# Patient Record
Sex: FEMALE | Race: WHITE | NOT HISPANIC OR LATINO | Employment: FULL TIME | URBAN - NONMETROPOLITAN AREA
[De-identification: names, ages, dates, MRNs, and addresses within clinical notes are randomized per-mention and may not be internally consistent; named-entity substitution may affect disease eponyms.]

---

## 2017-01-16 ENCOUNTER — APPOINTMENT (OUTPATIENT)
Dept: PHYSICAL THERAPY | Facility: CLINIC | Age: 37
End: 2017-01-16
Payer: COMMERCIAL

## 2017-01-16 PROCEDURE — 97161 PT EVAL LOW COMPLEX 20 MIN: CPT

## 2017-01-16 PROCEDURE — 97140 MANUAL THERAPY 1/> REGIONS: CPT

## 2017-01-16 PROCEDURE — 97110 THERAPEUTIC EXERCISES: CPT

## 2017-01-18 ENCOUNTER — APPOINTMENT (OUTPATIENT)
Dept: PHYSICAL THERAPY | Facility: CLINIC | Age: 37
End: 2017-01-18
Payer: COMMERCIAL

## 2017-01-18 PROCEDURE — 97140 MANUAL THERAPY 1/> REGIONS: CPT

## 2017-01-18 PROCEDURE — 97110 THERAPEUTIC EXERCISES: CPT

## 2017-01-20 ENCOUNTER — APPOINTMENT (OUTPATIENT)
Dept: PHYSICAL THERAPY | Facility: CLINIC | Age: 37
End: 2017-01-20
Payer: COMMERCIAL

## 2019-11-01 ENCOUNTER — EVALUATION (OUTPATIENT)
Dept: PHYSICAL THERAPY | Facility: CLINIC | Age: 39
End: 2019-11-01
Payer: COMMERCIAL

## 2019-11-01 ENCOUNTER — TRANSCRIBE ORDERS (OUTPATIENT)
Dept: PHYSICAL THERAPY | Facility: CLINIC | Age: 39
End: 2019-11-01

## 2019-11-01 DIAGNOSIS — M43.17 SPONDYLOLISTHESIS OF LUMBOSACRAL REGION: Primary | ICD-10-CM

## 2019-11-01 PROCEDURE — 97535 SELF CARE MNGMENT TRAINING: CPT | Performed by: PHYSICAL THERAPIST

## 2019-11-01 PROCEDURE — 97162 PT EVAL MOD COMPLEX 30 MIN: CPT | Performed by: PHYSICAL THERAPIST
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
## 2019-11-04 ENCOUNTER — OFFICE VISIT (OUTPATIENT)
Dept: PHYSICAL THERAPY | Facility: CLINIC | Age: 39
End: 2019-11-04
Payer: COMMERCIAL

## 2019-11-04 DIAGNOSIS — M43.17 SPONDYLOLISTHESIS OF LUMBOSACRAL REGION: Primary | ICD-10-CM

## 2019-11-04 PROCEDURE — 97110 THERAPEUTIC EXERCISES: CPT | Performed by: PHYSICAL THERAPIST

## 2019-11-04 PROCEDURE — 97140 MANUAL THERAPY 1/> REGIONS: CPT | Performed by: PHYSICAL THERAPIST

## 2019-11-04 NOTE — PROGRESS NOTES
Daily Note     Today's date: 2019  Patient name: Deysi Evans  : 1980  MRN: [id number]  Referring provider: Dahiana Rushing PA-C  Dx:   Encounter Diagnosis     ICD-10-CM    1  Spondylolisthesis of lumbosacral region M43 17                   Subjective: "I'm not too bad today  The exercises were ok at home"  Objective: See treatment diary below  Manual           LE Stretch    15 min                                                                       Exercise Diary           Abdominal Brace    2x10         Brace with march    2x10         Brace with SLR             Brace with heel slides    2x10 B/L         Bridges             Bridge with hip abduction    2x10         Bridge with hip adduction    2x10         Lower Abd curl    2x10         Alternating Hip flex/Abd isometrics             B/L Hip/Abd isometrics                                                                                                                                                               Modalities                                                             Assessment: Patient with good tolerance to first treatment today  Minimal VC's required for correct performance of TE  Mild discomfort noted with lower abd curls otherwise no increase in symptoms reported  Patient may benefit from home TENS unit for pain relief  Plan: Continue per plan of care        Precautions: None

## 2019-11-07 ENCOUNTER — OFFICE VISIT (OUTPATIENT)
Dept: PHYSICAL THERAPY | Facility: CLINIC | Age: 39
End: 2019-11-07
Payer: COMMERCIAL

## 2019-11-07 DIAGNOSIS — M43.17 SPONDYLOLISTHESIS OF LUMBOSACRAL REGION: Primary | ICD-10-CM

## 2019-11-07 PROCEDURE — 97140 MANUAL THERAPY 1/> REGIONS: CPT | Performed by: PHYSICAL THERAPIST

## 2019-11-07 PROCEDURE — 97110 THERAPEUTIC EXERCISES: CPT | Performed by: PHYSICAL THERAPIST

## 2019-11-07 NOTE — PROGRESS NOTES
Daily Note     Today's date: 2019  Patient name: Angelica Bocanegra  : 1980  MRN: [id number]  Referring provider: Praneeth Antoine PA-C  Dx:   Encounter Diagnosis     ICD-10-CM    1  Spondylolisthesis of lumbosacral region M43 17                   Subjective: "I was a little sore after last time"  Objective: See treatment diary below  [facility]        LE Stretch    15 min  15 min                                                                     Exercise Diary         Abdominal Brace    2x10  2x10       Brace with march    2x10  2x10       Brace with SLR      2x10 B       Brace with heel slides    2x10 B/L  2x10 B/L       Bridges             Bridge with hip abduction    2x10  2x10 Blue       Bridge with hip adduction    2x10  2x10       Lower Abd curl    2x10  2x10       Alternating Hip flex/Abd isometrics             B/L Hip/Abd isometrics                                                                                                                                                               Modalities                                                             Assessment: Patient with good tolerance to treatment today  PT notes improving core stability with therex  Patient given and instructed on TENS unit today  Plan: Continue per plan of care        Precautions: None

## 2019-11-14 ENCOUNTER — OFFICE VISIT (OUTPATIENT)
Dept: PHYSICAL THERAPY | Facility: CLINIC | Age: 39
End: 2019-11-14
Payer: COMMERCIAL

## 2019-11-14 DIAGNOSIS — M43.17 SPONDYLOLISTHESIS OF LUMBOSACRAL REGION: Primary | ICD-10-CM

## 2019-11-14 PROCEDURE — 97110 THERAPEUTIC EXERCISES: CPT | Performed by: PHYSICAL THERAPIST

## 2019-11-14 PROCEDURE — 97140 MANUAL THERAPY 1/> REGIONS: CPT | Performed by: PHYSICAL THERAPIST

## 2019-11-14 NOTE — PROGRESS NOTES
Daily Note     Today's date: 2019  Patient name: Princess Holley  : 1980  MRN: [id number]  Referring provider: Tenzin Nuñez PA-C  Dx:   Encounter Diagnosis     ICD-10-CM    1  Spondylolisthesis of lumbosacral region M43 17                   Subjective: "It's not too bad  I was sitting a lot on my trip"  Objective: See treatment diary below  [facility]      LE Stretch    15 min  15 min  15 min                                                                    Exercise Diary       Abdominal Brace    2x10  2x10  2x10     Brace with march    2x10  2x10  2x10     Brace with SLR      2x10 B  2x10 B     Brace with heel slides    2x10 B/L  2x10 B/L       Bridges             Bridge with hip abduction    2x10  2x10 Blue  2x10 Blue     Bridge with hip adduction    2x10  2x10  2x10     Lower Abd curl    2x10  2x10       Alternating Hip flex/Abd isometrics        2x10     B/L Hip/Abd isometrics        2x10      Abd Crunches        2x10                                                                                                                                         Modalities                                                             Assessment: Patient with good tolerance to treatment today  No increased pain reported with treatment today  Added core strengthening exercises today  Plan: Continue per plan of care        Precautions: None

## 2019-11-15 ENCOUNTER — OFFICE VISIT (OUTPATIENT)
Dept: PHYSICAL THERAPY | Facility: CLINIC | Age: 39
End: 2019-11-15
Payer: COMMERCIAL

## 2019-11-15 DIAGNOSIS — M43.17 SPONDYLOLISTHESIS OF LUMBOSACRAL REGION: Primary | ICD-10-CM

## 2019-11-15 PROCEDURE — 97110 THERAPEUTIC EXERCISES: CPT | Performed by: PHYSICAL THERAPIST

## 2019-11-15 PROCEDURE — 97140 MANUAL THERAPY 1/> REGIONS: CPT | Performed by: PHYSICAL THERAPIST

## 2019-11-15 NOTE — PROGRESS NOTES
Daily Note     Today's date: 11/15/2019  Patient name: Morris Gallo  : 1980  MRN: [id number]  Referring provider: Julita Fitzpatrick PA-C  Dx:   Encounter Diagnosis     ICD-10-CM    1  Spondylolisthesis of lumbosacral region M43 17                   Subjective: Patient states "I'm feeling better but I haven't worked out so that may be why  I can tell my lower abs are stronger"  Objective: See treatment diary below  [facility] 11/1  11/4  11/7  11/14  11/15   LE Stretch    15 min  15 min  15 min   15 min                                                                 Exercise Diary  11/1  11/4  11/7  11/14  11/15   Abdominal Brace    2x10  2x10  2x10  2x10   Brace with march    2x10  2x10  2x10  2x10   Brace with SLR      2x10 B  2x10 B  2x10 B    Brace with heel slides    2x10 B/L  2x10 B/L       Bridges             Bridge with hip abduction    2x10  2x10 Blue  2x10 Blue  2x10    Bridge with hip adduction    2x10  2x10  2x10  2x10   Lower Abd curl    2x10  2x10       Alternating Hip flex/Abd isometrics        2x10  2x10   B/L Hip/Abd isometrics        2x10  2x10    Abd Crunches        2x10  2x10                                                                                                                                       Modalities                                                             Assessment: Patient with good tolerance to treatment today  Patient continues to make gains with core stability  Continue to progress as tolerated  Plan: Continue per plan of care        Precautions: None

## 2019-11-19 ENCOUNTER — OFFICE VISIT (OUTPATIENT)
Dept: PHYSICAL THERAPY | Facility: CLINIC | Age: 39
End: 2019-11-19
Payer: COMMERCIAL

## 2019-11-19 DIAGNOSIS — M43.17 SPONDYLOLISTHESIS OF LUMBOSACRAL REGION: Primary | ICD-10-CM

## 2019-11-19 PROCEDURE — 97140 MANUAL THERAPY 1/> REGIONS: CPT | Performed by: PHYSICAL THERAPIST

## 2019-11-19 PROCEDURE — 97110 THERAPEUTIC EXERCISES: CPT | Performed by: PHYSICAL THERAPIST

## 2019-11-19 NOTE — PROGRESS NOTES
Daily Note     Today's date: 2019  Patient name: Cristofer Lilly  : 1980  MRN: [id number]  Referring provider: Lisa Perry PA-C  Dx:   Encounter Diagnosis     ICD-10-CM    1  Spondylolisthesis of lumbosacral region M43 17                   Subjective: "My back is feeling pretty good"  Objective: See treatment diary below  [facility] 11/19  11/4  11/7  11/14  11/15   LE Stretch  15 min  15 min  15 min  15 min   15 min                                                                 Exercise Diary  11/19  11/4  11/7  11/14  11/15   Abdominal Brace  2x10  2x10  2x10  2x10  2x10   Brace with march  2x10  2x10  2x10  2x10  2x10   Brace with SLR  2x10 B    2x10 B  2x10 B  2x10 B    Brace with heel slides    2x10 B/L  2x10 B/L       Bridges             Bridge with hip abduction  2x10  2x10  2x10 Blue  2x10 Blue  2x10    Bridge with hip adduction  2x10  2x10  2x10  2x10  2x10   Lower Abd curl    2x10  2x10       Alternating Hip flex/Abd isometrics  2x10      2x10  2x10   B/L Hip/Abd isometrics  2x10      2x10  2x10    Abd Crunches  2x10      2x10  2x10                                                                                                                                       Modalities                                                             Assessment: Patient with good tolerance to treatment today  PT notes improving lumbar and core stability with exercises today  HS flexibility continues to improve  Plan: Continue per plan of care        Precautions: None

## 2019-11-21 ENCOUNTER — OFFICE VISIT (OUTPATIENT)
Dept: PHYSICAL THERAPY | Facility: CLINIC | Age: 39
End: 2019-11-21
Payer: COMMERCIAL

## 2019-11-21 DIAGNOSIS — M43.17 SPONDYLOLISTHESIS OF LUMBOSACRAL REGION: Primary | ICD-10-CM

## 2019-11-21 PROCEDURE — 97110 THERAPEUTIC EXERCISES: CPT | Performed by: PHYSICAL THERAPIST

## 2019-11-21 PROCEDURE — 97140 MANUAL THERAPY 1/> REGIONS: CPT | Performed by: PHYSICAL THERAPIST

## 2019-11-21 NOTE — PROGRESS NOTES
Daily Note     Today's date: 2019  Patient name: Princess Holley  : 1980  MRN: [id number]  Referring provider: Tenzin Nuñez PA-C  Dx:   Encounter Diagnosis     ICD-10-CM    1  Spondylolisthesis of lumbosacral region M43 17                   Subjective: Patient states "I'm feeling pretty good"  Objective: See treatment diary below  [facility] 11/19  11/21  11/7  11/14  11/15   LE Stretch  15 min  15 min  15 min  15 min   15 min                                                                 Exercise Diary  11/19  11/21  11/7  11/14  11/15   Abdominal Brace  2x10  2x10  2x10  2x10  2x10   Brace with march  2x10  2x10  2x10  2x10  2x10   Brace with SLR  2x10 B  2x10 B  2x10 B  2x10 B  2x10 B    Brace with heel slides      2x10 B/L       Bridges             Bridge with hip abduction  2x10  2x10  2x10 Blue  2x10 Blue  2x10    Bridge with hip adduction  2x10  2x10  2x10  2x10  2x10   Lower Abd curl      2x10       Alternating Hip flex/Abd isometrics  2x10  2x10    2x10  2x10   B/L Hip/Abd isometrics  2x10  2x10    2x10  2x10    Abd Crunches  2x10  2x10    2x10  2x10    Single leg Bridge    2x10          Dead Bug    2x10                                                                                                                 Modalities                                                             Assessment: Patient with good tolerance to treatment today  PT progressed core stabilization today with good results  Continue to progress as per pt tolerance  Plan: Continue per plan of care        Precautions: None

## 2019-11-22 ENCOUNTER — APPOINTMENT (OUTPATIENT)
Dept: PHYSICAL THERAPY | Facility: CLINIC | Age: 39
End: 2019-11-22
Payer: COMMERCIAL

## 2019-11-25 ENCOUNTER — APPOINTMENT (OUTPATIENT)
Dept: PHYSICAL THERAPY | Facility: CLINIC | Age: 39
End: 2019-11-25
Payer: COMMERCIAL

## 2019-11-27 ENCOUNTER — OFFICE VISIT (OUTPATIENT)
Dept: PHYSICAL THERAPY | Facility: CLINIC | Age: 39
End: 2019-11-27
Payer: COMMERCIAL

## 2019-11-27 DIAGNOSIS — M43.17 SPONDYLOLISTHESIS OF LUMBOSACRAL REGION: Primary | ICD-10-CM

## 2019-11-27 PROCEDURE — 97110 THERAPEUTIC EXERCISES: CPT | Performed by: PHYSICAL THERAPIST

## 2019-11-27 PROCEDURE — 97140 MANUAL THERAPY 1/> REGIONS: CPT | Performed by: PHYSICAL THERAPIST

## 2019-11-27 NOTE — PROGRESS NOTES
Daily Note     Today's date: 2019  Patient name: Maria Dolores Clement  : 1980  MRN: [id number]  Referring provider: Jeferson Lima PA-C  Dx:   Encounter Diagnosis     ICD-10-CM    1  Spondylolisthesis of lumbosacral region M43 17                   Subjective: Patient without new c/o today  Objective: See treatment diary below  [facility] 11/19  11/21  11/27  11/14  11/15   LE Stretch  15 min  15 min  15 min  15 min   15 min                                                                 Exercise Diary  11/19  11/21  11/27  11/14  11/15   Abdominal Brace  2x10  2x10  2x10  2x10  2x10   Brace with march  2x10  2x10  2x10  2x10  2x10   Brace with SLR  2x10 B  2x10 B  2x10 B  2x10 B  2x10 B    Brace with heel slides             Bridges             Bridge with hip abduction  2x10  2x10  2x10 Blue  2x10 Blue  2x10    Bridge with hip adduction  2x10  2x10  2x10  2x10  2x10   Lower Abd curl      2x10       Alternating Hip flex/Abd isometrics  2x10  2x10  2x10  2x10  2x10   B/L Hip/Abd isometrics  2x10  2x10  2x10  2x10  2x10    Abd Crunches  2x10  2x10  2x10  2x10  2x10    Single leg Bridge    2x10  2x10        Dead Bug    2x10  2x10                                                                                                               Modalities                                                             Assessment: Tolerated treatment well  Patient exhibited good technique with therapeutic exercises      Plan: Continue per plan of care        Precautions: None

## 2019-12-04 ENCOUNTER — OFFICE VISIT (OUTPATIENT)
Dept: PHYSICAL THERAPY | Facility: CLINIC | Age: 39
End: 2019-12-04
Payer: COMMERCIAL

## 2019-12-04 ENCOUNTER — TRANSCRIBE ORDERS (OUTPATIENT)
Dept: PHYSICAL THERAPY | Facility: CLINIC | Age: 39
End: 2019-12-04

## 2019-12-04 DIAGNOSIS — M43.17 ACQUIRED SPONDYLOLISTHESIS OF LUMBOSACRAL REGION: Primary | ICD-10-CM

## 2019-12-04 DIAGNOSIS — M43.17 SPONDYLOLISTHESIS OF LUMBOSACRAL REGION: Primary | ICD-10-CM

## 2019-12-04 PROCEDURE — 97110 THERAPEUTIC EXERCISES: CPT | Performed by: PHYSICAL THERAPIST

## 2019-12-04 PROCEDURE — 97140 MANUAL THERAPY 1/> REGIONS: CPT | Performed by: PHYSICAL THERAPIST

## 2019-12-04 NOTE — PROGRESS NOTES
PT Re-Evaluation     Today's date: 2019  Patient name: Jaqui Kruger  : 1980  MRN: [id number]  Referring provider: Lencho Evans PA-C  Dx:   Encounter Diagnosis     ICD-10-CM    1  Spondylolisthesis of lumbosacral region M43 17                   Assessment  Assessment details: Patient has progressed well with PT POC through 4 weeks of PT  Patient is demonstrating improved core stability and has consistently been able to progress with core stabilization exercises in PT  Patient does report improvement in pain compared to IE and she has been able to increase her workouts  Patient would benefit from continued PT intervention with further progression of POC for core stability in order to maximize functional and recreational ability  Impairments: activity intolerance, impaired physical strength, lacks appropriate home exercise program and pain with function    Goals  ST  Initiate HEP- Met  2  Patient to report decreased pain at worst by 25% in 3 weeks- Met    LT  Patient to improve core stability to [facility] in 6 weeks-  Progressing  2  Patient to improve LE flexibility to [facility] in 6 weeks- Progressing  3  Patient to report decreased pain at worst to 1-2/10 in 6 weeks- Progressing  4   Patient to return to normal recreational ability with minimal issue in 6 weeks- [facility]  Patient would benefit from: skilled physical therapy  Planned modality interventions: cryotherapy, thermotherapy: hydrocollator packs, ultrasound and unattended electrical stimulation  Planned therapy interventions: abdominal trunk stabilization, manual therapy, massage, patient education, strengthening, stretching, therapeutic activities, therapeutic exercise, therapeutic training, functional ROM exercises, flexibility, graded activity, graded exercise and home exercise program  Frequency: 2x week  Duration in visits: 8  Duration in weeks: 4  Treatment plan discussed with: patient        Subjective Evaluation    History of Present Illness  Date of onset: 2019  Pain  At best pain ratin  At worst pain ratin  Quality: sharp (shooting and tingly in LE)  Aggravating factors: standing and sitting  Progression: improved      Diagnostic Tests  MRI studies: abnormal (Spondylolisthesis L5-S1)  Treatments  Current treatment: chiropractic  Patient Goals  Patient goals for therapy: increased strength          Objective     Palpation     Additional Palpation Details  TTP at L5-S1 level    Neurological Testing     Sensation     Lumbar   Left   Intact: light touch    Right   Intact: light touch    Active Range of Motion     Lumbar   Normal active range of motion    Strength/Myotome Testing     Lumbar   Left   Normal strength    Right   Normal strength    Tests     Lumbar     Left   Negative passive SLR  Right   Negative passive SLR       General Comments:    Lower quarter screen   Hips: unremarkable  Knees: unremarkable  Foot/ankle: unremarkable             Precautions: None         Manual  11/19  11/21  11/27  12/4  11/15   LE Stretch  15 min  15 min  15 min  15 min   15 min                                                                 Exercise Diary  11/19  11/21  11/27  12/4  11/15   Abdominal Brace  2x10  2x10  2x10  2x10  2x10   Brace with march  2x10  2x10  2x10  2x10  2x10   Brace with SLR  2x10 B  2x10 B  2x10 B  2x10 B  2x10 B    Brace with heel slides             Bridges             Bridge with hip abduction  2x10  2x10  2x10 Blue   2x10    Bridge with hip adduction  2x10  2x10  2x10   2x10   Lower Abd curl      2x10       Alternating Hip flex/Abd isometrics  2x10  2x10  2x10  2x10  2x10   B/L Hip/Abd isometrics  2x10  2x10  2x10  2x10  2x10    Abd Crunches  2x10  2x10  2x10  2x10  2x10    Single leg Bridge    2x10  2x10  2x10      Dead Bug    2x10  2x10  2x10      Abd Flexion machine        65# 2x10      Leg Press        45# 2x10                                                                                 Modalities

## 2019-12-04 NOTE — LETTER
2019    Lexus Kelley, [address] [address]    Patient: Zhang Gomez   YOB: 1980   Date of Visit: 2019     Encounter Diagnosis     ICD-10-CM    1  Spondylolisthesis of lumbosacral region M43 17        Dear Dr Jennifer Jordan: Thank you for your recent referral of Zhang Gomez  Please review the attached evaluation summary from Ame's recent visit  Please verify that you agree with the plan of care by signing the attached order  If you have any questions or concerns, please do not hesitate to call  I sincerely appreciate the opportunity to share in the care of one of your patients and hope to have another opportunity to work with you in the near future  Sincerely,    Gail Del Rio, PT      Referring Provider:      I certify that I have read the below Plan of Care and certify the need for these services furnished under this plan of treatment while under my care  Lexus Kelley PA-C  [address]: [address] [place] [address]          PT Re-Evaluation     Today's date: 2019  Patient name: Zhang Gomez  : 1980  MRN: [id number]  Referring provider: Wandy Goodson PA-C  Dx:   Encounter Diagnosis     ICD-10-CM    1  Spondylolisthesis of lumbosacral region M43 17                   Assessment  Assessment details: Patient has progressed well with PT POC through 4 weeks of PT  Patient is demonstrating improved core stability and has consistently been able to progress with core stabilization exercises in PT  Patient does report improvement in pain compared to IE and she has been able to increase her workouts  Patient would benefit from continued PT intervention with further progression of POC for core stability in order to maximize functional and recreational ability      Impairments: activity intolerance, impaired physical strength, lacks appropriate home exercise program and pain with function    Goals  ST  Initiate HEP- Met  2  Patient to report decreased pain at worst by 25% in 3 weeks- Met    LT  Patient to improve core stability to [facility] in 6 weeks-  Progressing  2  Patient to improve LE flexibility to [facility] in 6 weeks- Progressing  3  Patient to report decreased pain at worst to 1-2/10 in 6 weeks- Progressing  4  Patient to return to normal recreational ability with minimal issue in 6 weeks- [facility]  Patient would benefit from: skilled physical therapy  Planned modality interventions: cryotherapy, thermotherapy: hydrocollator packs, ultrasound and unattended electrical stimulation  Planned therapy interventions: abdominal trunk stabilization, manual therapy, massage, patient education, strengthening, stretching, therapeutic activities, therapeutic exercise, therapeutic training, functional ROM exercises, flexibility, graded activity, graded exercise and home exercise program  Frequency: 2x week  Duration in visits: 8  Duration in weeks: 4  Treatment plan discussed with: patient        Subjective Evaluation    History of Present Illness  Date of onset: 2019  Pain  At best pain ratin  At worst pain ratin  Quality: sharp (shooting and tingly in LE)  Aggravating factors: standing and sitting  Progression: improved      Diagnostic Tests  MRI studies: abnormal (Spondylolisthesis L5-S1)  Treatments  Current treatment: chiropractic  Patient Goals  Patient goals for therapy: increased strength          Objective     Palpation     Additional Palpation Details  TTP at L5-S1 level    Neurological Testing     Sensation     Lumbar   Left   Intact: light touch    Right   Intact: light touch    Active Range of Motion     Lumbar   Normal active range of motion    Strength/Myotome Testing     Lumbar   Left   Normal strength    Right   Normal strength    Tests     Lumbar     Left   Negative passive SLR  Right   Negative passive SLR       General Comments:    Lower quarter screen   Hips: unremarkable  Knees: unremarkable  Foot/ankle: unremarkable             Precautions: None         Manual  11/19  11/21  11/27  12/4  11/15   LE Stretch  15 min  15 min  15 min  15 min   15 min                                                                 Exercise Diary  11/19  11/21  11/27  12/4  11/15   Abdominal Brace  2x10  2x10  2x10  2x10  2x10   Brace with march  2x10  2x10  2x10  2x10  2x10   Brace with SLR  2x10 B  2x10 B  2x10 B  2x10 B  2x10 B    Brace with heel slides             Bridges             Bridge with hip abduction  2x10  2x10  2x10 Blue   2x10    Bridge with hip adduction  2x10  2x10  2x10   2x10   Lower Abd curl      2x10       Alternating Hip flex/Abd isometrics  2x10  2x10  2x10  2x10  2x10   B/L Hip/Abd isometrics  2x10  2x10  2x10  2x10  2x10    Abd Crunches  2x10  2x10  2x10  2x10  2x10    Single leg Bridge    2x10  2x10  2x10      Dead Bug    2x10  2x10  2x10      Abd Flexion machine        65# 2x10      Leg Press        45# 2x10                                                                                 Modalities

## 2019-12-06 ENCOUNTER — OFFICE VISIT (OUTPATIENT)
Dept: PHYSICAL THERAPY | Facility: CLINIC | Age: 39
End: 2019-12-06
Payer: COMMERCIAL

## 2019-12-06 DIAGNOSIS — M43.17 SPONDYLOLISTHESIS OF LUMBOSACRAL REGION: Primary | ICD-10-CM

## 2019-12-06 PROCEDURE — 97140 MANUAL THERAPY 1/> REGIONS: CPT | Performed by: PHYSICAL THERAPIST

## 2019-12-06 PROCEDURE — 97110 THERAPEUTIC EXERCISES: CPT | Performed by: PHYSICAL THERAPIST

## 2019-12-06 NOTE — PROGRESS NOTES
Daily Note     Today's date: 2019  Patient name: James Mendez  : 1980  MRN: [id number]  Referring provider: Michael Mandujano PA-C  Dx:   Encounter Diagnosis     ICD-10-CM    1  Spondylolisthesis of lumbosacral region M43 17                   Subjective: Patient states "yesterday I was at work and I was bending over a lot reaching into boxes and my back really hurt"  Objective: See treatment diary below  [facility]    LE Stretch  15 min  15 min  15 min  15 min   15 min                                                                 Exercise Diary     Abdominal Brace  2x10  2x10  2x10  2x10  2x10   Brace with march  2x10  2x10  2x10  2x10  2x10   Brace with SLR  2x10 B  2x10 B  2x10 B  2x10 B  2x10 B    Brace with heel slides             Bridges             Bridge with hip abduction  2x10  2x10  2x10 Blue      Bridge with hip adduction  2x10  2x10  2x10      Lower Abd curl      2x10       Alternating Hip flex/Abd isometrics  2x10  2x10  2x10  2x10  2x10   B/L Hip/Abd isometrics  2x10  2x10  2x10  2x10  2x10    Abd Crunches  2x10  2x10  2x10  2x10  2x10    Single leg Bridge    2x10  2x10  2x10  2x10    Dead Bug    2x10  2x10  2x10  2x10    Abd Flexion machine        65# 2x10  65# 2x10    Leg Press        45# 2x10  45# 2x10                                                                               Modalities                                                             Assessment: Tolerated treatment well  Patient exhibited good technique with therapeutic exercises  Patient able to complete POC without limitation today  Patient encouraged to engage core muscles when bending and lifting to aid with stability  Plan: Continue per plan of care        Precautions: None

## 2019-12-11 ENCOUNTER — OFFICE VISIT (OUTPATIENT)
Dept: PHYSICAL THERAPY | Facility: CLINIC | Age: 39
End: 2019-12-11
Payer: COMMERCIAL

## 2019-12-11 DIAGNOSIS — M43.17 SPONDYLOLISTHESIS OF LUMBOSACRAL REGION: Primary | ICD-10-CM

## 2019-12-11 PROCEDURE — 97110 THERAPEUTIC EXERCISES: CPT | Performed by: PHYSICAL THERAPIST

## 2019-12-11 PROCEDURE — 97140 MANUAL THERAPY 1/> REGIONS: CPT | Performed by: PHYSICAL THERAPIST

## 2019-12-11 NOTE — PROGRESS NOTES
Daily Note     Today's date: 2019  Patient name: Inez Bucio  : 1980  MRN: [id number]  Referring provider: Cate Bang PA-C  Dx:   Encounter Diagnosis     ICD-10-CM    1  Spondylolisthesis of lumbosacral region M43 17                   Subjective: Patient states "I'm doing good today"  Objective: See treatment diary below  [facility]    LE Stretch  15 min  15 min  15 min  15 min   15 min                                                                 Exercise Diary     Abdominal Brace  2x10  2x10  2x10  2x10  2x10   Brace with march  2x10  2x10  2x10  2x10  2x10   Brace with SLR  2x10 B  2x10 B  2x10 B  2x10 B  2x10 B    Brace with heel slides             Bridges             Bridge with hip abduction   2x10  2x10 Blue       Bridge with hip adduction   2x10  2x10       Lower Abd curl      2x10       Alternating Hip flex/Abd isometrics  2x10  2x10  2x10  2x10  2x10   B/L Hip/Abd isometrics  2x10  2x10  2x10  2x10  2x10    Abd Crunches  2x10  2x10  2x10  2x10  2x10    Single leg Bridge  2x10  2x10  2x10  2x10  2x10    Dead Bug  2x10  2x10  2x10  2x10  2x10    Abd Flexion machine  65# 2x10      65# 2x10  65# 2x10    Leg Press  50# 2x10      45# 2x10  45# 2x10                                                                               Modalities                                                             Assessment: Tolerated treatment well  Patient exhibited good technique with therapeutic exercises      Plan: Continue per plan of care        Precautions: None

## 2019-12-13 ENCOUNTER — APPOINTMENT (OUTPATIENT)
Dept: PHYSICAL THERAPY | Facility: CLINIC | Age: 39
End: 2019-12-13
Payer: COMMERCIAL

## 2019-12-20 ENCOUNTER — OFFICE VISIT (OUTPATIENT)
Dept: PHYSICAL THERAPY | Facility: CLINIC | Age: 39
End: 2019-12-20
Payer: COMMERCIAL

## 2019-12-20 DIAGNOSIS — M43.17 SPONDYLOLISTHESIS OF LUMBOSACRAL REGION: Primary | ICD-10-CM

## 2019-12-20 PROCEDURE — 97110 THERAPEUTIC EXERCISES: CPT | Performed by: PHYSICAL THERAPIST

## 2019-12-20 PROCEDURE — 97140 MANUAL THERAPY 1/> REGIONS: CPT | Performed by: PHYSICAL THERAPIST

## 2019-12-20 NOTE — PROGRESS NOTES
Daily Note     Today's date: 2019  Patient name: Sigifredo Serrato  : 1980  MRN: [id number]  Referring provider: Kim Hernandez PA-C  Dx:   Encounter Diagnosis     ICD-10-CM    1  Spondylolisthesis of lumbosacral region M43 17                   Subjective: "I feel pretty good  I've been working out and not having an issue  I only have some pain with burpees"  Objective: See treatment diary below  [facility]    LE Stretch  15 min  15 min  15 min  15 min   15 min                                                                 Exercise Diary     Abdominal Brace  2x10  2x10  2x10  2x10  2x10   Brace with march  2x10  2x10  2x10  2x10  2x10   Brace with SLR  2x10 B  2x10 B  2x10 B  2x10 B  2x10 B    Brace with heel slides             Bridges             Bridge with hip abduction     2x10 Blue       Bridge with hip adduction     2x10       Lower Abd curl      2x10       Alternating Hip flex/Abd isometrics  2x10  2x10  2x10  2x10  2x10   B/L Hip/Abd isometrics  2x10  2x10  2x10  2x10  2x10    Abd Crunches  2x10  2x10  2x10  2x10  2x10    Single leg Bridge  2x10  2x10  2x10  2x10  2x10    Dead Bug  2x10  2x10  2x10  2x10  2x10    Abd Flexion machine  65# 2x10  65# 2x10    65# 2x10  65# 2x10    Leg Press  50# 2x10  50# 2x10    45# 2x10  45# 2x10                                                                               Modalities                                                             Assessment: Tolerated treatment well  Patient exhibited good technique with therapeutic exercises      Plan: Continue per plan of care        Precautions: None

## 2019-12-24 ENCOUNTER — OFFICE VISIT (OUTPATIENT)
Dept: PHYSICAL THERAPY | Facility: CLINIC | Age: 39
End: 2019-12-24
Payer: COMMERCIAL

## 2019-12-24 DIAGNOSIS — M43.17 SPONDYLOLISTHESIS OF LUMBOSACRAL REGION: Primary | ICD-10-CM

## 2019-12-24 PROCEDURE — 97140 MANUAL THERAPY 1/> REGIONS: CPT | Performed by: PHYSICAL THERAPIST

## 2019-12-24 PROCEDURE — 97110 THERAPEUTIC EXERCISES: CPT | Performed by: PHYSICAL THERAPIST

## 2019-12-24 NOTE — PROGRESS NOTES
Daily Note     Today's date: 2019  Patient name: Stacie Pruett  : 1980  MRN: [id number]  Referring provider: Alka Marquis PA-C  Dx:   Encounter Diagnosis     ICD-10-CM    1  Spondylolisthesis of lumbosacral region M43 17                   Subjective: "I'm doing good today"  Objective: See treatment diary below  [facility]    LE Stretch  15 min  15 min  15 min  15 min   15 min                                                                 Exercise Diary     Abdominal Brace  2x10  2x10  2x10  2x10  2x10   Brace with march  2x10  2x10  2x10  2x10  2x10   Brace with SLR  2x10 B  2x10 B  2x10 B  2x10 B  2x10 B    Brace with heel slides             Bridges             Bridge with hip abduction            Bridge with hip adduction            Lower Abd curl             Alternating Hip flex/Abd isometrics  2x10  2x10  2x10  2x10  2x10   B/L Hip/Abd isometrics  2x10  2x10  2x10  2x10  2x10    Abd Crunches  2x10  2x10  2x10  2x10  2x10    Single leg Bridge  2x10  2x10  2x10  2x10  2x10    Dead Bug  2x10  2x10  2x10  2x10  2x10    Abd Flexion machine  65# 2x10  65# 2x10  65# 2x10  65# 2x10  65# 2x10    Leg Press  50# 2x10  50# 2x10  50# 2x10  45# 2x10  45# 2x10    Bridges on Theraball      2x10        Curls with LE on theraball      2x10        Brace- Bicycle      2x10                                         Modalities                                                             Assessment: Patient with good tolerance to treatment today  PT progressed core stabilization with good results  Continue to progress as tolerated  Plan: Continue per plan of care        Precautions: None

## 2019-12-27 ENCOUNTER — OFFICE VISIT (OUTPATIENT)
Dept: PHYSICAL THERAPY | Facility: CLINIC | Age: 39
End: 2019-12-27
Payer: COMMERCIAL

## 2019-12-27 DIAGNOSIS — M43.17 SPONDYLOLISTHESIS OF LUMBOSACRAL REGION: Primary | ICD-10-CM

## 2019-12-27 PROCEDURE — 97110 THERAPEUTIC EXERCISES: CPT | Performed by: PHYSICAL THERAPIST

## 2019-12-27 PROCEDURE — 97140 MANUAL THERAPY 1/> REGIONS: CPT | Performed by: PHYSICAL THERAPIST

## 2019-12-27 NOTE — PROGRESS NOTES
Daily Note     Today's date: 2019  Patient name: Suly Hsu  : 1980  MRN: [id number]  Referring provider: Marcela Elkins PA-C  Dx:   Encounter Diagnosis     ICD-10-CM    1  Spondylolisthesis of lumbosacral region M43 17                   Subjective: "I was a little sore the past few days"  Objective: See treatment diary below  [facility]    LE Stretch  15 min  15 min  15 min  15 min   15 min                                                                 Exercise Diary     Abdominal Brace  2x10  2x10  2x10  2x10  2x10   Brace with march  2x10  2x10  2x10  2x10  2x10   Brace with SLR  2x10 B  2x10 B  2x10 B  2x10 B  2x10 B    Brace with heel slides             Bridges             Bridge with hip abduction             Bridge with hip adduction             Lower Abd curl             Alternating Hip flex/Abd isometrics  2x10  2x10  2x10  2x10  2x10   B/L Hip/Abd isometrics  2x10  2x10  2x10  2x10  2x10    Abd Crunches  2x10  2x10  2x10  2x10  2x10    Single leg Bridge  2x10  2x10  2x10  2x10  2x10    Dead Bug  2x10  2x10  2x10  2x10  2x10    Abd Flexion machine  65# 2x10  65# 2x10  65# 2x10  65# 2x10  65# 2x10    Leg Press  50# 2x10  50# 2x10  50# 2x10  45# 2x10  45# 2x10    Bridges on Theraball      2x10  2x10      Curls with LE on theraball      2x10  2x10      Brace- Bicycle      2x10  2x10                                       Modalities                                                             Assessment: Tolerated treatment well  Patient exhibited good technique with therapeutic exercises      Plan: Continue per plan of care        Precautions: None

## 2019-12-30 ENCOUNTER — OFFICE VISIT (OUTPATIENT)
Dept: PHYSICAL THERAPY | Facility: CLINIC | Age: 39
End: 2019-12-30
Payer: COMMERCIAL

## 2019-12-30 DIAGNOSIS — M43.17 SPONDYLOLISTHESIS OF LUMBOSACRAL REGION: Primary | ICD-10-CM

## 2019-12-30 PROCEDURE — 97140 MANUAL THERAPY 1/> REGIONS: CPT | Performed by: PHYSICAL THERAPIST

## 2019-12-30 PROCEDURE — 97110 THERAPEUTIC EXERCISES: CPT | Performed by: PHYSICAL THERAPIST

## 2019-12-30 NOTE — LETTER
2020    Corinne Smith, [address] [address]    Patient: Portia Flaherty   YOB: 1980   Date of Visit: 2019     Encounter Diagnosis     ICD-10-CM    1  Spondylolisthesis of lumbosacral region M43 17        Dear Dr Love Gaytan: Thank you for your recent referral of Portia Flaherty  Please review the attached evaluation summary from Ame's recent visit  Please verify that you agree with the plan of care by signing the attached order  If you have any questions or concerns, please do not hesitate to call  I sincerely appreciate the opportunity to share in the care of one of your patients and hope to have another opportunity to work with you in the near future  Sincerely,    Shannan Schwarz, PT      Referring Provider:      I certify that I have read the below Plan of Care and certify the need for these services furnished under this plan of treatment while under my care  Corinne Smith PA-C  [address]: [phone]          Daily Note     Today's date: 2019  Patient name: Portia Flaherty  : 1980  MRN: [id number]  Referring provider: Mauro Wan PA-C  Dx:   Encounter Diagnosis     ICD-10-CM    1  Spondylolisthesis of lumbosacral region M43 17                   Subjective: "I'm doing good today"        Objective: See treatment diary below  [facility]    LE Stretch  15 min  15 min  15 min  15 min   15 min                                                                 Exercise Diary     Abdominal Brace  2x10  2x10  2x10  2x10  2x10   Brace with march  2x10  2x10  2x10  2x10  2x10   Brace with SLR  2x10 B  2x10 B  2x10 B  2x10 B  2x10 B    Brace with heel slides             Bridges             Bridge with hip abduction             Bridge with hip adduction             Lower Abd curl           Alternating Hip flex/Abd isometrics  2x10  2x10  2x10  2x10  2x10   B/L Hip/Abd isometrics  2x10  2x10  2x10  2x10  2x10    Abd Crunches  2x10  2x10  2x10  2x10  2x10    Single leg Bridge  2x10  2x10  2x10  2x10  2x10    Dead Bug  2x10  2x10  2x10  2x10  2x10    Abd Flexion machine  65# 2x10  65# 2x10  65# 2x10  65# 2x10  65# 2x10    Leg Press  50# 2x10  50# 2x10  50# 2x10  45# 2x10  50# 2x10    Bridges on Theraball      2x10  2x10  2x10    Curls with LE on theraball      2x10  2x10  2x10    Brace- Bicycle      2x10  2x10  2x10                                     Modalities                                                             Assessment: Tolerated treatment well  Patient exhibited good technique with therapeutic exercises  Core stability and LE flexibility continues to improve  Plan: Continue per plan of care  Precautions: None                       PT Re-Evaluation     Today's date: 2020  Patient name: Yessica Engel  : 1980  MRN: [id number]  Referring provider: Sofia Mujica PA-C  Dx:   Encounter Diagnosis     ICD-10-CM    1  Spondylolisthesis of lumbosacral region M43 17        Start Time: 0750  Stop Time: 0845  Total time in clinic (min): 55 minutes    Assessment  Assessment details: Patient continues to make progress with PT POC  Patient is showing improved core stability however remains limited  She is progressing well with POC and is performing higher levels of abdominal stabilization  Patient would benefit from continued PT intervention with focus on further progression of stabilization exercises in order to maximize strength and stability in order to maximize functional and recreational ability  Impairments: activity intolerance, impaired physical strength, lacks appropriate home exercise program and pain with function    Goals  ST  Initiate HEP- Met  2  Patient to report decreased pain at worst by 25% in 3 weeks- Met    LT   Patient to improve core stability to [facility] in 6 weeks-  Progressing  2  Patient to improve LE flexibility to [facility] in 6 weeks- Progressing  3  Patient to report decreased pain at worst to 1-2/10 in 6 weeks- Progressing  4  Patient to return to normal recreational ability with minimal issue in 6 weeks- [facility]  Patient would benefit from: skilled physical therapy  Planned modality interventions: cryotherapy, thermotherapy: hydrocollator packs, ultrasound and unattended electrical stimulation  Other planned modality interventions: Modalities PRN  Planned therapy interventions: abdominal trunk stabilization, manual therapy, massage, patient education, strengthening, stretching, therapeutic activities, therapeutic exercise, therapeutic training, functional ROM exercises, flexibility, graded activity, graded exercise and home exercise program  Frequency: 2x week  Duration in visits: 8  Duration in weeks: 4  Treatment plan discussed with: patient        Subjective Evaluation    History of Present Illness  Date of onset: 2019  Pain  At best pain ratin  At worst pain ratin  Quality: sharp (shooting and tingly in LE)  Aggravating factors: standing and sitting  Progression: improved      Diagnostic Tests  MRI studies: abnormal (Spondylolisthesis L5-S1)  Treatments  Current treatment: chiropractic  Patient Goals  Patient goals for therapy: increased strength          Objective     Palpation     Additional Palpation Details  TTP at L5-S1 level    Neurological Testing     Sensation     Lumbar   Left   Intact: light touch    Right   Intact: light touch    Active Range of Motion     Lumbar   Normal active range of motion    Strength/Myotome Testing     Lumbar   Left   Normal strength    Right   Normal strength    Tests     Lumbar     Left   Negative passive SLR  Right   Negative passive SLR       General Comments:    Lower quarter screen   Hips: unremarkable  Knees: unremarkable  Foot/ankle: unremarkable             Precautions: None [facility] 11/19  11/21  11/27  12/4  11/15   LE Stretch  15 min  15 min  15 min  15 min   15 min                                                                 Exercise Diary  11/19  11/21  11/27  12/4  11/15   Abdominal Brace  2x10  2x10  2x10  2x10  2x10   Brace with march  2x10  2x10  2x10  2x10  2x10   Brace with SLR  2x10 B  2x10 B  2x10 B  2x10 B  2x10 B    Brace with heel slides             Bridges             Bridge with hip abduction  2x10  2x10  2x10 Blue   2x10    Bridge with hip adduction  2x10  2x10  2x10   2x10   Lower Abd curl      2x10       Alternating Hip flex/Abd isometrics  2x10  2x10  2x10  2x10  2x10   B/L Hip/Abd isometrics  2x10  2x10  2x10  2x10  2x10    Abd Crunches  2x10  2x10  2x10  2x10  2x10    Single leg Bridge    2x10  2x10  2x10      Dead Bug    2x10  2x10  2x10      Abd Flexion machine        65# 2x10      Leg Press        45# 2x10                                                                                 Modalities

## 2019-12-30 NOTE — PROGRESS NOTES
Daily Note     Today's date: 2019  Patient name: Candice Ramsey  : 1980  MRN: [id number]  Referring provider: Edwin Beyer PA-C  Dx:   Encounter Diagnosis     ICD-10-CM    1  Spondylolisthesis of lumbosacral region M43 17                   Subjective: "I'm doing good today"  Objective: See treatment diary below  [facility]    LE Stretch  15 min  15 min  15 min  15 min   15 min                                                                 Exercise Diary     Abdominal Brace  2x10  2x10  2x10  2x10  2x10   Brace with march  2x10  2x10  2x10  2x10  2x10   Brace with SLR  2x10 B  2x10 B  2x10 B  2x10 B  2x10 B    Brace with heel slides             Bridges             Bridge with hip abduction             Bridge with hip adduction             Lower Abd curl             Alternating Hip flex/Abd isometrics  2x10  2x10  2x10  2x10  2x10   B/L Hip/Abd isometrics  2x10  2x10  2x10  2x10  2x10    Abd Crunches  2x10  2x10  2x10  2x10  2x10    Single leg Bridge  2x10  2x10  2x10  2x10  2x10    Dead Bug  2x10  2x10  2x10  2x10  2x10    Abd Flexion machine  65# 2x10  65# 2x10  65# 2x10  65# 2x10  65# 2x10    Leg Press  50# 2x10  50# 2x10  50# 2x10  45# 2x10  50# 2x10    Bridges on Theraball      2x10  2x10  2x10    Curls with LE on theraball      2x10  2x10  2x10    Brace- Bicycle      2x10  2x10  2x10                                     Modalities                                                             Assessment: Tolerated treatment well  Patient exhibited good technique with therapeutic exercises  Core stability and LE flexibility continues to improve  Plan: Continue per plan of care        Precautions: None

## 2020-01-02 ENCOUNTER — APPOINTMENT (OUTPATIENT)
Dept: PHYSICAL THERAPY | Facility: CLINIC | Age: 40
End: 2020-01-02
Payer: COMMERCIAL

## 2020-01-09 ENCOUNTER — APPOINTMENT (OUTPATIENT)
Dept: PHYSICAL THERAPY | Facility: CLINIC | Age: 40
End: 2020-01-09
Payer: COMMERCIAL

## 2020-01-15 ENCOUNTER — OFFICE VISIT (OUTPATIENT)
Dept: PHYSICAL THERAPY | Facility: CLINIC | Age: 40
End: 2020-01-15
Payer: COMMERCIAL

## 2020-01-15 DIAGNOSIS — M43.17 SPONDYLOLISTHESIS OF LUMBOSACRAL REGION: Primary | ICD-10-CM

## 2020-01-15 PROCEDURE — 97110 THERAPEUTIC EXERCISES: CPT | Performed by: PHYSICAL THERAPIST

## 2020-01-15 PROCEDURE — 97140 MANUAL THERAPY 1/> REGIONS: CPT | Performed by: PHYSICAL THERAPIST

## 2020-01-15 NOTE — PROGRESS NOTES
Daily Note     Today's date: 1/15/2020  Patient name: James Mendez  : 1980  MRN: [id number]  Referring provider: Michael Mandujano PA-C  Dx:   Encounter Diagnosis     ICD-10-CM    1  Spondylolisthesis of lumbosacral region M43 17                   Subjective: Patient states "I still have the trouble with the same things like bending and lifting"  Objective: See treatment diary below  [facility] 1/15/20  12/20  12/24  12/27  12/30   LE Stretch  15 min  15 min  15 min  15 min   15 min                                                                 Exercise Diary  1/15/20  12/20  12/24  12/27  12/30   Abdominal Brace  2x10  2x10  2x10  2x10  2x10   Brace with march  2x10  2x10  2x10  2x10  2x10   Brace with SLR  2x10 B  2x10 B  2x10 B  2x10 B  2x10 B    Brace with heel slides             Bridges             Bridge with hip abduction             Bridge with hip adduction             Lower Abd curl             Alternating Hip flex/Abd isometrics  2x10  2x10  2x10  2x10  2x10   B/L Hip/Abd isometrics  2x10  2x10  2x10  2x10  2x10    Abd Crunches  2x10  2x10  2x10  2x10  2x10    Single leg Bridge  2x10  2x10  2x10  2x10  2x10    Dead Bug  2x10  2x10  2x10  2x10  2x10    Abd Flexion machine  65# 2x10  65# 2x10  65# 2x10  65# 2x10  65# 2x10    Leg Press  50# 2x10  50# 2x10  50# 2x10  45# 2x10  50# 2x10    Bridges on Theraball  2x10    2x10  2x10  2x10    Curls with LE on theraball  2x10    2x10  2x10  2x10    Brace- Bicycle  2x10    2x10  2x10  2x10                                     Modalities                                                             Assessment: Tolerated treatment well  Patient exhibited good technique with therapeutic exercises      Plan: Continue per plan of care        Precautions: None

## 2020-01-17 ENCOUNTER — OFFICE VISIT (OUTPATIENT)
Dept: PHYSICAL THERAPY | Facility: CLINIC | Age: 40
End: 2020-01-17
Payer: COMMERCIAL

## 2020-01-17 DIAGNOSIS — M43.17 SPONDYLOLISTHESIS OF LUMBOSACRAL REGION: Primary | ICD-10-CM

## 2020-01-17 PROCEDURE — 97110 THERAPEUTIC EXERCISES: CPT | Performed by: PHYSICAL THERAPIST

## 2020-01-17 NOTE — PROGRESS NOTES
Daily Note     Today's date: 2020  Patient name: James Mendez  : 1980  MRN: [id number]  Referring provider: Michael Mandujano PA-C  Dx:   Encounter Diagnosis     ICD-10-CM    1  Spondylolisthesis of lumbosacral region M43 17                   Subjective: Patient states "I'm ok today  I have to have a shortened treatment today because I have to get to work"  Objective: See treatment diary below  [facility] 1/15/20 1/17/20  12/24  12/27  12/30   LE Stretch  15 min   15 min  15 min   15 min                                                                 Exercise Diary  1/15/20  1/17/20  12/24  12/27  12/30   Abdominal Brace  2x10  2x10  2x10  2x10  2x10   Brace with march  2x10  2x10  2x10  2x10  2x10   Brace with SLR  2x10 B  2x10 B  2x10 B  2x10 B  2x10 B    Brace with heel slides             Bridges             Bridge with hip abduction             Bridge with hip adduction             Lower Abd curl             Alternating Hip flex/Abd isometrics  2x10  2x10  2x10  2x10  2x10   B/L Hip/Abd isometrics  2x10  2x10  2x10  2x10  2x10    Abd Crunches  2x10  2x10  2x10  2x10  2x10    Single leg Bridge  2x10  2x10  2x10  2x10  2x10    Dead Bug  2x10  2x10  2x10  2x10  2x10    Abd Flexion machine  65# 2x10   65# 2x10  65# 2x10  65# 2x10    Leg Press  50# 2x10   50# 2x10  45# 2x10  50# 2x10    Bridges on Theraball  2x10    2x10  2x10  2x10    Curls with LE on theraball  2x10    2x10  2x10  2x10    Brace- Bicycle  2x10    2x10  2x10  2x10                                     Modalities                                                             Assessment: Tolerated treatment well  Patient exhibited good technique with therapeutic exercises  Shortened treatment today due to patient having to be at work early  Plan: Continue per plan of care        Precautions: None

## 2020-01-29 ENCOUNTER — OFFICE VISIT (OUTPATIENT)
Dept: PHYSICAL THERAPY | Facility: CLINIC | Age: 40
End: 2020-01-29
Payer: COMMERCIAL

## 2020-01-29 ENCOUNTER — TRANSCRIBE ORDERS (OUTPATIENT)
Dept: PHYSICAL THERAPY | Facility: CLINIC | Age: 40
End: 2020-01-29

## 2020-01-29 DIAGNOSIS — M43.17 SPONDYLOLISTHESIS OF LUMBOSACRAL REGION: Primary | ICD-10-CM

## 2020-01-29 DIAGNOSIS — M43.17 ACQUIRED SPONDYLOLISTHESIS OF LUMBOSACRAL REGION: Primary | ICD-10-CM

## 2020-01-29 PROCEDURE — 97110 THERAPEUTIC EXERCISES: CPT | Performed by: PHYSICAL THERAPIST

## 2020-01-29 PROCEDURE — 97140 MANUAL THERAPY 1/> REGIONS: CPT | Performed by: PHYSICAL THERAPIST

## 2020-01-29 NOTE — PROGRESS NOTES
Daily Note     Today's date: 2020  Patient name: Cristofer Lilly  : 1980  MRN: [id number]  Referring provider: Lisa Perry PA-C  Dx:   Encounter Diagnosis     ICD-10-CM    1  Spondylolisthesis of lumbosacral region M43 17                   Subjective: Patient states "Last week while we were away I bent forward to pick something up and I got pain so bad I thought I was going to have to go to the hospital  I couldn't stand up for about a minute"  Objective: See treatment diary below  [facility] 1/15/20 1/17/20  1/29/20  12/27  12/30   LE Stretch  15 min    15 min  15 min   15 min                                                                 Exercise Diary  1/15/20  1/17/20  1/29/20  12/27  12/30   Abdominal Brace  2x10  2x10  2x10  2x10  2x10   Brace with march  2x10  2x10  2x10  2x10  2x10   Brace with SLR  2x10 B  2x10 B  2x10 B  2x10 B  2x10 B    Brace with heel slides             Bridges             Bridge with hip abduction             Bridge with hip adduction             Lower Abd curl             Alternating Hip flex/Abd isometrics  2x10  2x10  2x10  2x10  2x10   B/L Hip/Abd isometrics  2x10  2x10  2x10  2x10  2x10    Abd Crunches  2x10  2x10  2x10  2x10  2x10    Single leg Bridge  2x10  2x10  2x10  2x10  2x10    Dead Bug  2x10  2x10  2x10  2x10  2x10    Abd Flexion machine  65# 2x10    65# 2x10  65# 2x10  65# 2x10    Leg Press  50# 2x10    60# 2x10  45# 2x10  50# 2x10    Bridges on Theraball  2x10    2x10  2x10  2x10    Curls with LE on theraball  2x10    2x10  2x10  2x10    Brace- Bicycle  2x10    2x10  2x10  2x10                                     Modalities                                                             Assessment: Patient tolerated treatment well today  PT plans to progress POC at [place] with adding lumbar stability exercises  Plan: Continue per plan of care        Precautions: None

## 2020-01-29 NOTE — PROGRESS NOTES
PT Re-Evaluation     Today's date: 2020  Patient name: Juancarlos Tran  : 1980  MRN: [id number]  Referring provider: Cody Morales PA-C  Dx:   Encounter Diagnosis     ICD-10-CM    1  Spondylolisthesis of lumbosacral region M43 17        Start Time: 0750  Stop Time: 0845  Total time in clinic (min): 55 minutes    Assessment  Assessment details: Patient continues to make progress with PT POC  Patient is showing improved core stability however remains limited  She is progressing well with POC and is performing higher levels of abdominal stabilization  Patient would benefit from continued PT intervention with focus on further progression of stabilization exercises in order to maximize strength and stability in order to maximize functional and recreational ability  Impairments: activity intolerance, impaired physical strength, lacks appropriate home exercise program and pain with function    Goals  ST  Initiate HEP- Met  2  Patient to report decreased pain at worst by 25% in 3 weeks- Met    LT  Patient to improve core stability to [facility] in 6 weeks-  Progressing  2  Patient to improve LE flexibility to [facility] in 6 weeks- Progressing  3  Patient to report decreased pain at worst to 1-2/10 in 6 weeks- Progressing  4   Patient to return to normal recreational ability with minimal issue [facility]  Patient would benefit from: skilled physical therapy  Planned modality interventions: cryotherapy, thermotherapy: hydrocollator packs, ultrasound and unattended electrical stimulation  Other planned modality interventions: Modalities PRN  Planned therapy interventions: abdominal trunk stabilization, manual therapy, massage, patient education, strengthening, stretching, therapeutic activities, therapeutic exercise, therapeutic training, functional ROM exercises, flexibility, graded activity, graded exercise and home exercise program  Frequency: 2x week  Duration in visits: 8  Duration in weeks: 4  Treatment plan discussed with: patient        Subjective Evaluation    History of Present Illness  Date of onset: 2019  Pain  At best pain ratin  At worst pain ratin  Quality: sharp (shooting and tingly in LE)  Aggravating factors: standing and sitting  Progression: improved      Diagnostic Tests  MRI studies: abnormal (Spondylolisthesis L5-S1)  Treatments  Current treatment: chiropractic  Patient Goals  Patient goals for therapy: increased strength          Objective     Palpation     Additional Palpation Details  TTP at L5-S1 level    Neurological Testing     Sensation     Lumbar   Left   Intact: light touch    Right   Intact: light touch    Active Range of Motion     Lumbar   Normal active range of motion    Strength/Myotome Testing     Lumbar   Left   Normal strength    Right   Normal strength    Tests     Lumbar     Left   Negative passive SLR  Right   Negative passive SLR       General Comments:    Lower quarter screen   Hips: unremarkable  Knees: unremarkable  Foot/ankle: unremarkable             Precautions: None         Manual  11/19  11/21  11/27  12/4  11/15   LE Stretch  15 min  15 min  15 min  15 min   15 min                                                                 Exercise Diary  11/19  11/21  11/27  12/4  11/15   Abdominal Brace  2x10  2x10  2x10  2x10  2x10   Brace with march  2x10  2x10  2x10  2x10  2x10   Brace with SLR  2x10 B  2x10 B  2x10 B  2x10 B  2x10 B    Brace with heel slides             Bridges             Bridge with hip abduction  2x10  2x10  2x10 Blue   2x10    Bridge with hip adduction  2x10  2x10  2x10   2x10   Lower Abd curl      2x10       Alternating Hip flex/Abd isometrics  2x10  2x10  2x10  2x10  2x10   B/L Hip/Abd isometrics  2x10  2x10  2x10  2x10  2x10    Abd Crunches  2x10  2x10  2x10  2x10  2x10    Single leg Bridge    2x10  2x10  2x10      Dead Bug    2x10  2x10  2x10      Abd Flexion machine        65# 2x10      Leg Press        45# 2x10                                                                               Modalities

## 2020-01-29 NOTE — PROGRESS NOTES
PT Re-Evaluation     Today's date: 2020  Patient name: Stacie Pruett  : 1980  MRN: [id number]  Referring provider: Alka Marquis PA-C  Dx:   Encounter Diagnosis     ICD-10-CM    1  Spondylolisthesis of lumbosacral region M43 17                   Assessment  Assessment details: Patient continues to make progress with PT POC  Patient has gained significant abdominal stabilization over the past few weeks  Patient remains with difficulty bending and being in flexed position  Patient is hesitant to perform bending activities and exercise due to possibility of increased pain  PT plans on adding lumbar stabilization exercises at next visit to address instability in order to maximize functional movements and recreational ability  Impairments: activity intolerance, impaired physical strength, lacks appropriate home exercise program and pain with function    Goals  ST  Initiate HEP- Met  2  Patient to report decreased pain at worst by 25% in 3 weeks- Met    LT  Patient to improve core and lumbar stability to [facility] in 6 weeks-  Progressing  2  Patient to improve LE flexibility to [facility] in 6 weeks- Progressing  3  Patient to report decreased pain at worst to 1-2/10 in 6 weeks- Progressing  4   Patient to return to normal recreational ability with minimal issue in 6 weeks- [facility]  Patient would benefit from: skilled physical therapy  Planned modality interventions: cryotherapy, thermotherapy: hydrocollator packs, ultrasound and unattended electrical stimulation  Other planned modality interventions: Modalities PRN  Planned therapy interventions: abdominal trunk stabilization, manual therapy, massage, patient education, strengthening, stretching, therapeutic activities, therapeutic exercise, therapeutic training, functional ROM exercises, flexibility, graded activity, graded exercise and home exercise program  Frequency: 2x week  Duration in visits: 8  Duration in weeks: 4  Treatment plan discussed with: patient        Subjective Evaluation    History of Present Illness  Date of onset: 2019  Pain  At best pain ratin  At worst pain ratin  Quality: sharp (shooting and tingly in LE)  Aggravating factors: standing and sitting  Progression: improved      Diagnostic Tests  MRI studies: abnormal (Spondylolisthesis L5-S1)  Treatments  Current treatment: chiropractic  Patient Goals  Patient goals for therapy: increased strength          Objective     Palpation     Additional Palpation Details  TTP at L5-S1 level    Neurological Testing     Sensation     Lumbar   Left   Intact: light touch    Right   Intact: light touch    Active Range of Motion     Lumbar   Normal active range of motion    Strength/Myotome Testing     Lumbar   Left   Normal strength    Right   Normal strength    Tests     Lumbar     Left   Negative passive SLR  Right   Negative passive SLR       General Comments:    Lower quarter screen   Hips: unremarkable  Knees: unremarkable  Foot/ankle: unremarkable             Precautions: None DISCHARGE

## 2020-01-29 NOTE — LETTER
2020    Jardo Nguyen, [address] [address]    Patient: Andre Pérez   YOB: 1980   Date of Visit: 2020     Encounter Diagnosis     ICD-10-CM    1  Spondylolisthesis of lumbosacral region M43 17        Dear Dr Jessica Pozo: Thank you for your recent referral of Andre Pérez  Please review the attached evaluation summary from Ame's recent visit  Please verify that you agree with the plan of care by signing the attached order  If you have any questions or concerns, please do not hesitate to call  I sincerely appreciate the opportunity to share in the care of one of your patients and hope to have another opportunity to work with you in the near future  Sincerely,    Jagjit Garcia, PT      Referring Provider:      I certify that I have read the below Plan of Care and certify the need for these services furnished under this plan of treatment while under my care  Jarod Nguyen PA-C  [address] [facility] [address]  VIA Mail          Daily Note     Today's date: 2020  Patient name: Andre Pérez  : 1980  MRN: [id number]  Referring provider: Elvira Hameed PA-C  Dx:   Encounter Diagnosis     ICD-10-CM    1  Spondylolisthesis of lumbosacral region M43 17                   Subjective: Patient states "Last week while we were away I bent forward to pick something up and I got pain so bad I thought I was going to have to go to the hospital  I couldn't stand up for about a minute"        Objective: See treatment diary below  [facility] 1/15/20 1/17/20  1/29/20  12/27  12/30   LE Stretch  15 min    15 min  15 min   15 min                                                                 Exercise Diary  1/15/20  1/17/20  1/29/20  12/27  12/30   Abdominal Brace  2x10  2x10  2x10  2x10  2x10   Brace with march  2x10  2x10  2x10  2x10  2x10   Brace with SLR  2x10 B  2x10 B  2x10 B  2x10 B  2x10 B    Brace with heel slides             Bridges             Bridge with hip abduction             Bridge with hip adduction             Lower Abd curl             Alternating Hip flex/Abd isometrics  2x10  2x10  2x10  2x10  2x10   B/L Hip/Abd isometrics  2x10  2x10  2x10  2x10  2x10    Abd Crunches  2x10  2x10  2x10  2x10  2x10    Single leg Bridge  2x10  2x10  2x10  2x10  2x10    Dead Bug  2x10  2x10  2x10  2x10  2x10    Abd Flexion machine  65# 2x10    65# 2x10  65# 2x10  65# 2x10    Leg Press  50# 2x10    60# 2x10  45# 2x10  50# 2x10    Bridges on Theraball  2x10    2x10  2x10  2x10    Curls with LE on theraball  2x10    2x10  2x10  2x10    Brace- Bicycle  2x10    2x10  2x10  2x10                                     Modalities                                                             Assessment: Patient tolerated treatment well today  PT plans to progress POC at [place] with adding lumbar stability exercises  Plan: Continue per plan of care  Precautions: None                             PT Re-Evaluation     Today's date: 2020  Patient name: Josué Villanueva  : 1980  MRN: [id number]  Referring provider: Neptali Rosario PA-C  Dx:   Encounter Diagnosis     ICD-10-CM    1  Spondylolisthesis of lumbosacral region M43 17                   Assessment  Assessment details: Patient continues to make progress with PT POC  Patient has gained significant abdominal stabilization over the past few weeks  Patient remains with difficulty bending and being in flexed position  Patient is hesitant to perform bending activities and exercise due to possibility of increased pain  PT plans on adding lumbar stabilization exercises at next visit to address instability in order to maximize functional movements and recreational ability  Impairments: activity intolerance, impaired physical strength, lacks appropriate home exercise program and pain with function    Goals  ST  Initiate HEP- Met  2   Patient to report decreased pain at worst by 25% in 3 weeks- Met    LT  Patient to improve core and lumbar stability to [facility] in 6 weeks-  Progressing  2  Patient to improve LE flexibility to [facility] in 6 weeks- Progressing  3  Patient to report decreased pain at worst to 1-2/10 in 6 weeks- Progressing  4  Patient to return to normal recreational ability with minimal issue [facility]  Patient would benefit from: skilled physical therapy  Planned modality interventions: cryotherapy, thermotherapy: hydrocollator packs, ultrasound and unattended electrical stimulation  Other planned modality interventions: Modalities PRN  Planned therapy interventions: abdominal trunk stabilization, manual therapy, massage, patient education, strengthening, stretching, therapeutic activities, therapeutic exercise, therapeutic training, functional ROM exercises, flexibility, graded activity, graded exercise and home exercise program  Frequency: 2x week  Duration in visits: 8  Duration in weeks: 4  Treatment plan discussed with: patient        Subjective Evaluation    History of Present Illness  Date of onset: 2019  Pain  At best pain ratin  At worst pain ratin  Quality: sharp (shooting and tingly in LE)  Aggravating factors: standing and sitting  Progression: improved      Diagnostic Tests  MRI studies: abnormal (Spondylolisthesis L5-S1)  Treatments  Current treatment: chiropractic  Patient Goals  Patient goals for therapy: increased strength          Objective     Palpation     Additional Palpation Details  TTP at L5-S1 level    Neurological Testing     Sensation     Lumbar   Left   Intact: light touch    Right   Intact: light touch    Active Range of Motion     Lumbar   Normal active range of motion    Strength/Myotome Testing     Lumbar   Left   Normal strength    Right   Normal strength    Tests     Lumbar     Left   Negative passive SLR  Right   Negative passive SLR       General Comments:    Lower quarter screen   Hips: unremarkable  Knees: unremarkable  Foot/ankle: unremarkable             Precautions: None

## 2020-01-31 ENCOUNTER — OFFICE VISIT (OUTPATIENT)
Dept: PHYSICAL THERAPY | Facility: CLINIC | Age: 40
End: 2020-01-31
Payer: COMMERCIAL

## 2020-01-31 DIAGNOSIS — M43.17 SPONDYLOLISTHESIS OF LUMBOSACRAL REGION: Primary | ICD-10-CM

## 2020-01-31 PROCEDURE — 97112 NEUROMUSCULAR REEDUCATION: CPT | Performed by: PHYSICAL THERAPIST

## 2020-01-31 PROCEDURE — 97110 THERAPEUTIC EXERCISES: CPT | Performed by: PHYSICAL THERAPIST

## 2020-01-31 NOTE — PROGRESS NOTES
Daily Note     Today's date: 2020  Patient name: Gi Mckeon  : 1980  MRN: [id number]  Referring provider: Mike Morris PA-C  Dx:   Encounter Diagnosis     ICD-10-CM    1  Spondylolisthesis of lumbosacral region M43 17                   Subjective: Patient without new c/o today  Objective: See treatment diary below  [facility] 1/15/20 1/17/20  1/29/20  1/31/20  12/30   LE Stretch  15 min    15 min  15 min   15 min                                                                 Exercise Diary  1/15/20  1/17/20  1/29/20  1/31/20  12/30   Abdominal Brace  2x10  2x10  2x10   2x10   Brace with march  2x10  2x10  2x10   2x10   Brace with SLR  2x10 B  2x10 B  2x10 B   2x10 B    Plank        2x30"     Lumbar Extension machine        40# 2x10     MTP and LTP on theraball        2x10 each     Bridge with hip adduction             Lower Abd curl             Alternating Hip flex/Abd isometrics  2x10  2x10  2x10  2x10  2x10   B/L Hip/Abd isometrics  2x10  2x10  2x10  2x10  2x10    Abd Crunches  2x10  2x10  2x10  2x10  2x10    Single leg Bridge  2x10  2x10  2x10  2x10  2x10    Dead Bug  2x10  2x10  2x10  2x10  2x10    Abd Flexion machine  65# 2x10    65# 2x10  65# 2x10  65# 2x10    Leg Press  50# 2x10    60# 2x10  60# 2x10  50# 2x10    Bridges on Theraball  2x10    2x10  2x10  2x10    Curls with LE on theraball  2x10    2x10  2x10  2x10    Brace- Bicycle  2x10    2x10  2x10  2x10    Quadriped OAL        2x10      Marches on Theraball        2x10           Modalities                                                             Assessment: Patient with good tolerance to treatment today  PT added lumbar stability exercises with good results to address instability  Continue to progress as tolerated  Plan: Continue per plan of care        Precautions: None

## 2020-02-05 ENCOUNTER — OFFICE VISIT (OUTPATIENT)
Dept: PHYSICAL THERAPY | Facility: CLINIC | Age: 40
End: 2020-02-05
Payer: COMMERCIAL

## 2020-02-05 DIAGNOSIS — M43.17 SPONDYLOLISTHESIS OF LUMBOSACRAL REGION: Primary | ICD-10-CM

## 2020-02-05 PROCEDURE — 97110 THERAPEUTIC EXERCISES: CPT | Performed by: PHYSICAL THERAPIST

## 2020-02-05 PROCEDURE — 97112 NEUROMUSCULAR REEDUCATION: CPT | Performed by: PHYSICAL THERAPIST

## 2020-02-05 NOTE — PROGRESS NOTES
Daily Note     Today's date: 2020  Patient name: Morris Gallo  : 1980  MRN: [id number]  Referring provider: Julita Fitzpatrick PA-C  Dx:   Encounter Diagnosis     ICD-10-CM    1  Spondylolisthesis of lumbosacral region M43 17                   Subjective: Patient states "My back is about the same"  Objective: See treatment diary below  [facility] 1/15/20 1/17/20  1/29/20  1/31/20  2/5/20   LE Stretch  15 min    15 min  15 min   15 min                                                                 Exercise Diary  1/15/20  1/17/20  1/29/20  1/31/20  2/5/20   Abdominal Brace  2x10  2x10  2x10      Brace with march  2x10  2x10  2x10      Brace with SLR  2x10 B  2x10 B  2x10 B      Plank        2x30"  2x30"   Lumbar Extension machine        40# 2x10  50# 2x10   MTP and LTP on theraball        2x10 each  2x10 BLK   Bridge with hip adduction             Lower Abd curl             Alternating Hip flex/Abd isometrics  2x10  2x10  2x10  2x10  2x10   B/L Hip/Abd isometrics  2x10  2x10  2x10  2x10  2x10    Abd Crunches  2x10  2x10  2x10  2x10  2x10    Single leg Bridge  2x10  2x10  2x10  2x10  2x10    Dead Bug  2x10  2x10  2x10  2x10  2x10    Abd Flexion machine  65# 2x10    65# 2x10  65# 2x10  65# 2x10    Leg Press  50# 2x10    60# 2x10  60# 2x10  60# 2x10    Bridges on Theraball  2x10    2x10  2x10  2x10    Curls with LE on theraball  2x10    2x10  2x10  2x10    Brace- Bicycle  2x10    2x10  2x10  2x10    Quadriped OAL        2x10  2x10    Marches on Theraball        2x10  2x10         Modalities                                                             Assessment: Tolerated treatment well  Patient exhibited good technique with therapeutic exercises      Plan: Continue per plan of care        Precautions: None

## 2020-02-14 ENCOUNTER — OFFICE VISIT (OUTPATIENT)
Dept: PHYSICAL THERAPY | Facility: CLINIC | Age: 40
End: 2020-02-14
Payer: COMMERCIAL

## 2020-02-14 DIAGNOSIS — M43.17 SPONDYLOLISTHESIS OF LUMBOSACRAL REGION: Primary | ICD-10-CM

## 2020-02-14 PROCEDURE — 97112 NEUROMUSCULAR REEDUCATION: CPT | Performed by: PHYSICAL THERAPIST

## 2020-02-14 PROCEDURE — 97110 THERAPEUTIC EXERCISES: CPT | Performed by: PHYSICAL THERAPIST

## 2020-02-14 NOTE — PROGRESS NOTES
Daily Note     Today's date: 2020  Patient name: Angelica Bocanegra  : 1980  MRN: [id number]  Referring provider: Praneeth Antoine PA-C  Dx:   Encounter Diagnosis     ICD-10-CM    1  Spondylolisthesis of lumbosacral region M43 17                   Subjective: "It's definitely the bent position that causes my pain"  Objective: See treatment diary below  [facility] 20   LE Stretch  15 min    15 min  15 min   15 min                                                                 Exercise Diary  20   Abdominal Brace   2x10  2x10       Brace with march   2x10  2x10       Brace with SLR   2x10 B  2x10 B       Plank  2x30"      2x30"  2x30"   Lumbar Extension machine  50# 2x10      40# 2x10  50# 2x10   MTP and LTP on theraball  2x10 BLK      2x10 each  2x10 BLK   Lumbar Extension on theraball             Hip Extension on Theraball             Alternating Hip flex/Abd isometrics  2x10  2x10  2x10  2x10  2x10   B/L Hip/Abd isometrics  2x10  2x10  2x10  2x10  2x10    Abd Crunches  2x10  2x10  2x10  2x10  2x10    Single leg Bridge  2x10  2x10  2x10  2x10  2x10    Dead Bug  2x10  2x10  2x10  2x10  2x10    Abd Flexion machine  65# 2x10    65# 2x10  65# 2x10  65# 2x10    Leg Press  50# 2x10    60# 2x10  60# 2x10  60# 2x10    Bridges on Theraball  2x10    2x10  2x10  2x10    Curls with LE on theraball  2x10    2x10  2x10  2x10    Brace- Bicycle  2x10    2x10  2x10  2x10    Quadriped OAL  2x10      2x10  2x10    Marches on Theraball  2x10      2x10  2x10         Modalities                                                             Assessment: Patient continues to demonstrate decreased lumbar stability leading to reports of pain  Plan to hold lumbar extension machine going forward due to flexed position being aggravating factor  Will trial lumbar and hip extension on theraball at [place] to facilitate lumbar stability         Plan: Continue per plan of care        Precautions: None

## 2020-02-19 ENCOUNTER — OFFICE VISIT (OUTPATIENT)
Dept: PHYSICAL THERAPY | Facility: CLINIC | Age: 40
End: 2020-02-19
Payer: COMMERCIAL

## 2020-02-19 DIAGNOSIS — M43.17 SPONDYLOLISTHESIS OF LUMBOSACRAL REGION: Primary | ICD-10-CM

## 2020-02-19 PROCEDURE — 97110 THERAPEUTIC EXERCISES: CPT | Performed by: PHYSICAL THERAPIST

## 2020-02-19 PROCEDURE — 97112 NEUROMUSCULAR REEDUCATION: CPT | Performed by: PHYSICAL THERAPIST

## 2020-02-19 NOTE — PROGRESS NOTES
Daily Note     Today's date: 2020  Patient name: Suly Hsu  : 1980  MRN: [id number]  Referring provider: Marcela Elkins PA-C  Dx:   Encounter Diagnosis     ICD-10-CM    1  Spondylolisthesis of lumbosacral region M43 17                   Subjective: Patient states "I'm doing ok today"  Objective: See treatment diary below  [facility] 20   LE Stretch      15 min  15 min   15 min                                                                 Exercise Diary  20   Abdominal Brace     2x10       Brace with march     2x10       Brace with SLR     2x10 B       Plank  2x30"  2x30"    2x30"  2x30"   Lumbar Extension machine  50# 2x10      40# 2x10  50# 2x10   MTP and LTP on theraball  2x10 BLK  2x10    2x10 each  2x10 BLK   Lumbar Extension on theraball    2x10         Hip Extension on Theraball    2x10 B/L         Alternating Hip flex/Abd isometrics  2x10  2x10  2x10  2x10  2x10   B/L Hip/Abd isometrics  2x10  2x10  2x10  2x10  2x10    Abd Crunches  2x10  2x10  2x10  2x10  2x10    Single leg Bridge  2x10  2x10  2x10  2x10  2x10    Dead Bug  2x10  2x10  2x10  2x10  2x10    Abd Flexion machine  65# 2x10  65# 2x10  65# 2x10  65# 2x10  65# 2x10    Leg Press  50# 2x10  65# 2x10  60# 2x10  60# 2x10  60# 2x10    Bridges on Theraball  2x10  2x10  2x10  2x10  2x10    Curls with LE on theraball  2x10  2x10  2x10  2x10  2x10    Brace- Bicycle  2x10  2x10  2x10  2x10  2x10    Quadriped OAL  2x10  2x10    2x10  2x10    Marches on Theraball  2x10  2x10    2x10  2x10         Modalities                                                             Assessment: Patient with good tolerance to treatment today  PT added lumbar and hip extension exercises on theraball today to address lumbar instability  Patient would benefit from continued focus on addressing lumbar instability to improve function  Plan: Continue per plan of care  Precautions: None

## 2020-02-26 ENCOUNTER — OFFICE VISIT (OUTPATIENT)
Dept: PHYSICAL THERAPY | Facility: CLINIC | Age: 40
End: 2020-02-26
Payer: COMMERCIAL

## 2020-02-26 DIAGNOSIS — M43.17 SPONDYLOLISTHESIS OF LUMBOSACRAL REGION: Primary | ICD-10-CM

## 2020-02-26 PROCEDURE — 97110 THERAPEUTIC EXERCISES: CPT | Performed by: PHYSICAL THERAPIST

## 2020-02-26 PROCEDURE — 97112 NEUROMUSCULAR REEDUCATION: CPT | Performed by: PHYSICAL THERAPIST

## 2020-02-26 NOTE — PROGRESS NOTES
Daily Note     Today's date: 2020  Patient name: Tomi Stacy  : 1980  MRN: [id number]  Referring provider: Shraddha Tuttle PA-C  Dx:   Encounter Diagnosis     ICD-10-CM    1  Spondylolisthesis of lumbosacral region M43 17                   Subjective: Patient states "My back is doing pretty good  I was in a car for 4 hours over the weekend and that really bothered me"  Objective: See treatment diary below  [facility] 20   LE Stretch        15 min   15 min                                                                 Exercise Diary  20   Abdominal Brace            Brace with march            Brace with SLR            Plank  2x30"  2x30"  2x30"  2x30"  2x30"   Lumbar Extension machine  50# 2x10      40# 2x10  50# 2x10   MTP and LTP on theraball  2x10 BLK  2x10  2x10 BLK   2x10 each  2x10 BLK   Lumbar Extension on theraball    2x10  2x10       Hip Extension on Theraball    2x10 B/L  2x10 B/L       Alternating Hip flex/Abd isometrics  2x10  2x10  2x10  2x10  2x10   B/L Hip/Abd isometrics  2x10  2x10  2x10  2x10  2x10    Abd Crunches  2x10  2x10  2x10  2x10  2x10    Single leg Bridge  2x10  2x10  2x10  2x10  2x10    Dead Bug  2x10  2x10  2x10  2x10  2x10    Abd Flexion machine  65# 2x10  65# 2x10  65# 2x10  65# 2x10  65# 2x10    Leg Press  50# 2x10  65# 2x10  65# 2x10  60# 2x10  60# 2x10    Bridges on Theraball  2x10  2x10  2x10  2x10  2x10    Curls with LE on theraball  2x10  2x10  2x10  2x10  2x10    Brace- Bicycle  2x10  2x10  2x10  2x10  2x10    Quadriped OAL  2x10  2x10  2x10  2x10  2x10    Marches on Theraball  2x10  2x10  2x10  2x10  2x10         Modalities                                                             Assessment: Patient with good tolerance to treatment today  Patient is independent with HEP and will continue with PT at home  Patient has reached maximum functional potential with PT         Plan: Patient to be discharged at this time        Precautions: None

## 2020-04-16 ENCOUNTER — TELEMEDICINE (OUTPATIENT)
Dept: PAIN MEDICINE | Facility: CLINIC | Age: 40
End: 2020-04-16
Payer: COMMERCIAL

## 2020-04-16 DIAGNOSIS — M43.17 SPONDYLOLISTHESIS AT L5-S1 LEVEL: ICD-10-CM

## 2020-04-16 DIAGNOSIS — G89.29 CHRONIC BILATERAL LOW BACK PAIN, UNSPECIFIED WHETHER SCIATICA PRESENT: ICD-10-CM

## 2020-04-16 DIAGNOSIS — M54.50 CHRONIC BILATERAL LOW BACK PAIN, UNSPECIFIED WHETHER SCIATICA PRESENT: ICD-10-CM

## 2020-04-16 DIAGNOSIS — M51.36 LUMBAR DEGENERATIVE DISC DISEASE: ICD-10-CM

## 2020-04-16 DIAGNOSIS — M46.1 SACROILIITIS (HCC): Primary | ICD-10-CM

## 2020-04-16 PROBLEM — M51.369 LUMBAR DEGENERATIVE DISC DISEASE: Status: ACTIVE | Noted: 2020-04-16

## 2020-04-16 PROCEDURE — 99203 OFFICE O/P NEW LOW 30 MIN: CPT | Performed by: ANESTHESIOLOGY

## 2020-04-16 RX ORDER — LEVOTHYROXINE, LIOTHYRONINE 9.5; 2.25 UG/1; UG/1
TABLET ORAL
COMMUNITY
Start: 2020-03-26

## 2020-04-16 RX ORDER — LEVOTHYROXINE SODIUM 0.12 MG/1
TABLET ORAL
COMMUNITY
Start: 2019-10-24

## 2020-04-16 RX ORDER — SPIRONOLACTONE 50 MG/1
50 TABLET, FILM COATED ORAL DAILY
COMMUNITY
Start: 2020-04-10

## 2020-04-16 RX ORDER — LEVOTHYROXINE, LIOTHYRONINE 38; 9 UG/1; UG/1
TABLET ORAL
COMMUNITY
Start: 2020-03-26

## 2020-04-20 ENCOUNTER — TELEPHONE (OUTPATIENT)
Dept: PAIN MEDICINE | Facility: CLINIC | Age: 40
End: 2020-04-20

## 2020-05-29 ENCOUNTER — HOSPITAL ENCOUNTER (OUTPATIENT)
Dept: RADIOLOGY | Facility: HOSPITAL | Age: 40
Discharge: HOME | End: 2020-05-29
Payer: COMMERCIAL

## 2020-05-29 DIAGNOSIS — M54.50 CHRONIC BILATERAL LOW BACK PAIN, UNSPECIFIED WHETHER SCIATICA PRESENT: ICD-10-CM

## 2020-05-29 DIAGNOSIS — G89.29 CHRONIC BILATERAL LOW BACK PAIN, UNSPECIFIED WHETHER SCIATICA PRESENT: ICD-10-CM

## 2020-05-29 DIAGNOSIS — M43.17 SPONDYLOLISTHESIS AT L5-S1 LEVEL: ICD-10-CM

## 2020-05-29 DIAGNOSIS — M51.36 LUMBAR DEGENERATIVE DISC DISEASE: ICD-10-CM

## 2020-05-29 PROCEDURE — 72114 X-RAY EXAM L-S SPINE BENDING: CPT

## 2020-06-10 ENCOUNTER — OFFICE VISIT (OUTPATIENT)
Dept: PAIN MEDICINE | Facility: CLINIC | Age: 40
End: 2020-06-10
Payer: COMMERCIAL

## 2020-06-10 VITALS
WEIGHT: 119 LBS | BODY MASS INDEX: 21.9 KG/M2 | DIASTOLIC BLOOD PRESSURE: 76 MMHG | TEMPERATURE: 98.3 F | HEIGHT: 62 IN | SYSTOLIC BLOOD PRESSURE: 124 MMHG

## 2020-06-10 DIAGNOSIS — M46.1 SACROILIITIS (HCC): ICD-10-CM

## 2020-06-10 DIAGNOSIS — G89.29 CHRONIC BILATERAL LOW BACK PAIN WITHOUT SCIATICA: Primary | ICD-10-CM

## 2020-06-10 DIAGNOSIS — M51.36 LUMBAR DEGENERATIVE DISC DISEASE: ICD-10-CM

## 2020-06-10 DIAGNOSIS — M43.17 SPONDYLOLISTHESIS AT L5-S1 LEVEL: ICD-10-CM

## 2020-06-10 DIAGNOSIS — G89.29 CHRONIC BILATERAL LOW BACK PAIN, UNSPECIFIED WHETHER SCIATICA PRESENT: ICD-10-CM

## 2020-06-10 DIAGNOSIS — M54.50 CHRONIC BILATERAL LOW BACK PAIN WITHOUT SCIATICA: Primary | ICD-10-CM

## 2020-06-10 DIAGNOSIS — M54.50 CHRONIC BILATERAL LOW BACK PAIN, UNSPECIFIED WHETHER SCIATICA PRESENT: ICD-10-CM

## 2020-06-10 PROCEDURE — 99213 OFFICE O/P EST LOW 20 MIN: CPT | Performed by: NURSE PRACTITIONER

## 2020-07-02 DIAGNOSIS — Z01.818 PREOP TESTING: Primary | ICD-10-CM

## 2020-07-09 DIAGNOSIS — Z01.818 PREOP TESTING: ICD-10-CM

## 2020-07-09 PROCEDURE — U0003 INFECTIOUS AGENT DETECTION BY NUCLEIC ACID (DNA OR RNA); SEVERE ACUTE RESPIRATORY SYNDROME CORONAVIRUS 2 (SARS-COV-2) (CORONAVIRUS DISEASE [COVID-19]), AMPLIFIED PROBE TECHNIQUE, MAKING USE OF HIGH THROUGHPUT TECHNOLOGIES AS DESCRIBED BY CMS-2020-01-R: HCPCS

## 2020-07-11 LAB
INPATIENT: NORMAL
SARS-COV-2 RNA SPEC QL NAA+PROBE: NOT DETECTED

## 2020-07-16 ENCOUNTER — HOSPITAL ENCOUNTER (OUTPATIENT)
Facility: HOSPITAL | Age: 40
Setting detail: OUTPATIENT SURGERY
Discharge: HOME | End: 2020-07-16
Attending: ANESTHESIOLOGY | Admitting: ANESTHESIOLOGY
Payer: COMMERCIAL

## 2020-07-16 ENCOUNTER — APPOINTMENT (OUTPATIENT)
Dept: RADIOLOGY | Facility: HOSPITAL | Age: 40
End: 2020-07-16
Payer: COMMERCIAL

## 2020-07-16 VITALS
OXYGEN SATURATION: 100 % | SYSTOLIC BLOOD PRESSURE: 108 MMHG | HEART RATE: 64 BPM | RESPIRATION RATE: 18 BRPM | DIASTOLIC BLOOD PRESSURE: 79 MMHG | TEMPERATURE: 97.7 F

## 2020-07-16 PROCEDURE — 27096 INJECT SACROILIAC JOINT: CPT | Performed by: ANESTHESIOLOGY

## 2020-07-16 PROCEDURE — 77002 NEEDLE LOCALIZATION BY XRAY: CPT

## 2020-07-16 RX ORDER — BUPIVACAINE HYDROCHLORIDE 2.5 MG/ML
INJECTION, SOLUTION EPIDURAL; INFILTRATION; INTRACAUDAL AS NEEDED
Status: DISCONTINUED | OUTPATIENT
Start: 2020-07-16 | End: 2020-07-16 | Stop reason: HOSPADM

## 2020-07-16 RX ORDER — METHYLPREDNISOLONE ACETATE 40 MG/ML
INJECTION, SUSPENSION INTRA-ARTICULAR; INTRALESIONAL; INTRAMUSCULAR; SOFT TISSUE AS NEEDED
Status: DISCONTINUED | OUTPATIENT
Start: 2020-07-16 | End: 2020-07-16 | Stop reason: HOSPADM

## 2020-07-16 RX ORDER — LIDOCAINE HYDROCHLORIDE 10 MG/ML
INJECTION, SOLUTION EPIDURAL; INFILTRATION; INTRACAUDAL; PERINEURAL AS NEEDED
Status: DISCONTINUED | OUTPATIENT
Start: 2020-07-16 | End: 2020-07-16 | Stop reason: HOSPADM

## 2020-07-16 NOTE — DISCHARGE INSTRUCTIONS
Steroid Joint Injection   WHAT YOU NEED TO KNOW:   A steroid joint injection is a procedure to inject steroid medicine into a joint  Steroid medicine decreases pain and inflammation  The injection may also contain an anesthetic (numbing medicine) to decrease pain  It may be done to treat conditions such as arthritis, gout, or carpal tunnel syndrome  The injections may be given in your knee, ankle, shoulder, elbow, wrist, ankle or sacroiliac joint  1  Do not apply heat to any area that is numb  If you have discomfort or soreness at the injection site, you may apply ice today, 20 minutes on and 20 minutes off  Tomorrow you may use ice or warm, moist heat  Do not apply ice or heat directly to the skin  2  You may have an increase or change in the discomfort for 36-48 hours after your treatment  Apply ice and continue with any pain medicine you have been prescribed  3  Do not do anything strenuous today  You may shower, but no tub baths or hot tubs today  You may resume your normal activities tomorrow, but do not overdo it  Resume normal activities slowly when you are feeling better  4  If you experience redness, drainage or swelling at the injection site, or if you develop a fever above 100 degrees, please call [facility] at [phone] or go to the Emergency Room  5  Continue to take all routine medicines prescribed by your primary care physician unless otherwise instructed by our staff  Most blood thinners should be started again according to your regularly scheduled dosing  If you have any questions, please give our office a call  If you have a problem specifically related to your procedure, please call our office at [phone]  Problems not related to your procedure should be directed to your primary care physician

## 2020-07-16 NOTE — H&P
Assessment:  Chronic bilateral low back pain without sciatica [M54 5, G89 29]  Sacroiliitis (HCC) [M46 1]  Lumbar degenerative disc disease [M51 36]    Plan:  Laci Cortez is a 45 y o  female with complaints of low back and hip pain presents to surgical center for procedure  1  We will perform a Bilateral Sacroiliac Joint injection (60612)  2  2  Follow-up 1 month after injection    Complete risks and benefits including bleeding, infection, tissue reaction, nerve injury and allergic reaction were discussed  The approach was demonstrated using models and literature was provided  Verbal and written consent was obtained  My impressions and treatment recommendations were discussed in detail with the patient who verbalized understanding and had no further questions  Discharge instructions were provided  I personally saw and examined the patient and I agree with the above discussed plan of care  Orders Placed This Encounter   Procedures    FL guided needle plac bx/asp/inj     Standing Status:   Standing     Number of Occurrences:   1     Order Specific Question:   Reason for Exam:     Answer:   B/L SIJ     Order Specific Question:   Is the patient pregnant? Answer:   No    Discharge Diet     Order Specific Question:   Diet Type:      Answer:   Regular    Activity as tolerated    Call provider for:  persistent nausea or vomiting    Call provider for:  severe uncontrolled pain    Call provider for:  redness, tenderness, or signs of infection (pain, swelling, redness, odor or green/yellow discharge around incision site)    Call provider for: active or persistent bleeding    Call provider for:  difficulty breathing, headache or visual disturbances    Call provider for:  hives    Call provider for:  persistent dizziness or light-headedness    Call provider for:  extreme fatigue    Remove dressing in 24 hours    Discharge patient     Standing Status:   Standing     Number of Occurrences:   1     No orders of the defined types were placed in this encounter  History of Present Illness:  Savanna Jeffers is a 45 y o  female who presents for a follow up office visit in regards to hip pain and low back pain  The patients current symptoms include 7/10 constant sharp, stabbing, throbbing pain without any particular time pattern  I have personally reviewed and/or updated the patient's past medical history, past surgical history, family history, social history, current medications, allergies, and vital signs today  Review of Systems   Musculoskeletal: Positive for arthralgias, back pain and myalgias  All other systems reviewed and are negative  Patient Active Problem List   Diagnosis    Spondylolisthesis at L5-S1 level    Lumbar degenerative disc disease    Chronic bilateral low back pain    Sacroiliitis ([facility] [address] )       No past medical history on file  No past surgical history on file  No family history on file  Social History     Occupational History    Not on file   Tobacco Use    Smoking status: Never Smoker    Smokeless tobacco: Never Used   Substance and Sexual Activity    Alcohol use: Yes     Comment: social    Drug use: Never    Sexual activity: Not on file       No current facility-administered medications on file prior to encounter        Current Outpatient Medications on File Prior to Encounter   Medication Sig    diclofenac sodium (VOLTAREN) 50 mg EC tablet Take 1 tablet (50 mg total) by mouth 2 (two) times a day    levothyroxine (Synthroid) 125 mcg tablet     NP THYROID 15 MG tablet TAKE ONE TABLET IN THE MORNING ON AN EMPTY STOMACH    NP THYROID 60 MG tablet TAKE ONE TABLET IN THE MORNING ON AN EMPTY STOMACH    spironolactone (ALDACTONE) 50 mg tablet Take 50 mg by mouth daily       No Known Allergies    Physical Exam:    /78   Pulse 75   Temp 97 7 °F (36 5 °C) (Tympanic)   Resp 18   SpO2 100%     Constitutional:normal, well developed, well nourished, alert, in no distress and non-toxic and no overt pain behavior    Eyes:anicteric  HEENT:grossly intact  Neck:supple, symmetric, trachea midline and no masses   Pulmonary:even and unlabored  Cardiovascular:No edema or pitting edema present  Skin:Normal without rashes or lesions and well hydrated  Psychiatric:Mood and affect appropriate  Neurologic:Cranial Nerves II-XII grossly intact  Musculoskeletal:antalgic

## 2020-07-16 NOTE — OP NOTE
OPERATIVE REPORT  PATIENT NAME: Vara Stage    :  1980  MRN: [id number]  Pt Location: [facility] OR ROOM 01    SURGERY DATE: 2020    Surgeon(s) and Role:     * Lakshmi Tong MD - Primary    Preop Diagnosis:  Chronic bilateral low back pain without sciatica [M54 5, G89 29]  Sacroiliitis (HCC) [M46 1]  Lumbar degenerative disc disease [M51 36]    Post-Op Diagnosis Codes:     * Chronic bilateral low back pain without sciatica [M54 5, G89 29]     * Sacroiliitis (HCC) [M46 1]     * Lumbar degenerative disc disease [M51 36]    Procedure(s) (LRB):  Bilateral Sacroiliac Joint injection (51369) (Right)    Specimen(s):  * No specimens in log *    Estimated Blood Loss:   Minimal    Drains:  * No LDAs found *    Anesthesia Type:   Local    Operative Indications:  Chronic bilateral low back pain without sciatica [M54 5, G89 29]  Sacroiliitis (HCC) [M46 1]  Lumbar degenerative disc disease [M51 36]      Operative Findings:  same    Complications:   None    Procedure and Technique:  Fluoroscopically-guided injection of the bilateral sacroiliac joint(s)    After discussing the risks, benefits, and alternatives to the procedure, the patient expressed understanding and wished to proceed  The patient was brought to the fluoroscopy suite and placed in the prone position  Procedural pause conducted to verify:  correct patient identity, procedure to be performed and as applicable, correct side and site, correct patient position, and availability of implants, special equipment and special requirements  Using fluoroscopy, the inferior portion of the left sacroiliac joint was identified  The skin was sterilely prepped and draped in the usual fashion using Chloraprep skin prep  The skin and subcutaneous tissue were anesthetized with 0 5% lidocaine  Using fluoroscopic guidance, a 3 5 inch 22 gauge spinal needle was advanced into joint  Proper needle positioning was confirmed using multiple fluoroscopic views    After negative aspiration, 0 5 ml Omnipaque 300 contrast was injected, showing intraarticular spread of contrast without any evidence of intravascular uptake  A 2 5 mL solution consisting of 40 mg of Depo-Medrol in 0 25% bupivacaine was injected slowly and incrementally into the joint  Following the injection, the needle was withdrawn slightly and flushed with lidocaine as it was fully extracted  The same procedure was performed on the opposite side using the same technique and achieving the same results  The patient tolerated the procedure well and there were no apparent complications  After appropriate observation, the patient was dismissed from the clinic in good condition under their own power  COMMENTS  The patient received a total steroid dose of 80 mg of Depo-Medrol     I was present for the entire procedure    Patient Disposition:  hemodynamically stable    SIGNATURE: Taran Hawley MD  DATE: July 16, 2020  TIME: 10:25 AM

## 2020-07-23 ENCOUNTER — TELEPHONE (OUTPATIENT)
Dept: PAIN MEDICINE | Facility: CLINIC | Age: 40
End: 2020-07-23

## 2020-09-10 ENCOUNTER — TELEPHONE (OUTPATIENT)
Dept: PAIN MEDICINE | Facility: CLINIC | Age: 40
End: 2020-09-10

## 2020-09-10 NOTE — TELEPHONE ENCOUNTER
Patient's  Louie Castellano called requesting status on record request form he sent us the end of August  Form is in Epic   Please advise, thx    Call back# [phone]

## 2021-10-19 ENCOUNTER — TELEPHONE (OUTPATIENT)
Dept: PAIN MEDICINE | Facility: CLINIC | Age: 41
End: 2021-10-19

## 2021-11-03 ENCOUNTER — OFFICE VISIT (OUTPATIENT)
Dept: PAIN MEDICINE | Facility: CLINIC | Age: 41
End: 2021-11-03
Payer: COMMERCIAL

## 2021-11-03 VITALS
DIASTOLIC BLOOD PRESSURE: 79 MMHG | HEIGHT: 62 IN | WEIGHT: 123 LBS | BODY MASS INDEX: 22.63 KG/M2 | SYSTOLIC BLOOD PRESSURE: 113 MMHG | HEART RATE: 83 BPM

## 2021-11-03 DIAGNOSIS — G89.29 CHRONIC BILATERAL LOW BACK PAIN WITHOUT SCIATICA: ICD-10-CM

## 2021-11-03 DIAGNOSIS — M54.50 CHRONIC BILATERAL LOW BACK PAIN WITHOUT SCIATICA: ICD-10-CM

## 2021-11-03 DIAGNOSIS — G89.4 CHRONIC PAIN SYNDROME: Primary | ICD-10-CM

## 2021-11-03 DIAGNOSIS — M46.1 SACROILIITIS (HCC): ICD-10-CM

## 2021-11-03 PROCEDURE — 99214 OFFICE O/P EST MOD 30 MIN: CPT | Performed by: NURSE PRACTITIONER

## 2021-11-03 RX ORDER — CLINDAMYCIN PHOSPHATE AND BENZOYL PEROXIDE 10; 50 MG/G; MG/G
GEL TOPICAL
COMMUNITY
Start: 2021-11-01

## 2021-12-30 ENCOUNTER — OFFICE VISIT (OUTPATIENT)
Dept: PAIN MEDICINE | Facility: CLINIC | Age: 41
End: 2021-12-30
Payer: COMMERCIAL

## 2021-12-30 VITALS
HEIGHT: 62 IN | BODY MASS INDEX: 23 KG/M2 | HEART RATE: 76 BPM | SYSTOLIC BLOOD PRESSURE: 100 MMHG | DIASTOLIC BLOOD PRESSURE: 67 MMHG | WEIGHT: 125 LBS

## 2021-12-30 DIAGNOSIS — G89.4 CHRONIC PAIN SYNDROME: Primary | ICD-10-CM

## 2021-12-30 DIAGNOSIS — M54.50 CHRONIC RIGHT-SIDED LOW BACK PAIN WITHOUT SCIATICA: ICD-10-CM

## 2021-12-30 DIAGNOSIS — M46.1 SACROILIITIS (HCC): ICD-10-CM

## 2021-12-30 DIAGNOSIS — M79.18 CHRONIC GLUTEAL PAIN: ICD-10-CM

## 2021-12-30 DIAGNOSIS — G89.29 CHRONIC GLUTEAL PAIN: ICD-10-CM

## 2021-12-30 DIAGNOSIS — M79.18 MYOFASCIAL PAIN SYNDROME: ICD-10-CM

## 2021-12-30 DIAGNOSIS — G89.29 CHRONIC RIGHT-SIDED LOW BACK PAIN WITHOUT SCIATICA: ICD-10-CM

## 2021-12-30 PROCEDURE — 99214 OFFICE O/P EST MOD 30 MIN: CPT | Performed by: NURSE PRACTITIONER

## 2021-12-30 RX ORDER — TIZANIDINE 2 MG/1
2 TABLET ORAL EVERY 8 HOURS PRN
Qty: 90 TABLET | Refills: 0 | Status: SHIPPED | OUTPATIENT
Start: 2021-12-30

## 2022-01-06 ENCOUNTER — TELEPHONE (OUTPATIENT)
Dept: PAIN MEDICINE | Facility: CLINIC | Age: 42
End: 2022-01-06

## 2022-01-06 NOTE — TELEPHONE ENCOUNTER
Attempted to contact patient to schedule her in office [facility]  Unable to contact patient as the mailbox is currently full

## 2022-02-23 ENCOUNTER — PROCEDURE VISIT (OUTPATIENT)
Dept: PAIN MEDICINE | Facility: CLINIC | Age: 42
End: 2022-02-23
Payer: COMMERCIAL

## 2022-02-23 DIAGNOSIS — M79.18 MYOFASCIAL PAIN SYNDROME: Primary | ICD-10-CM

## 2022-02-23 DIAGNOSIS — M79.18 CHRONIC GLUTEAL PAIN: ICD-10-CM

## 2022-02-23 DIAGNOSIS — G89.4 CHRONIC PAIN SYNDROME: ICD-10-CM

## 2022-02-23 DIAGNOSIS — G89.29 CHRONIC GLUTEAL PAIN: ICD-10-CM

## 2022-02-23 PROCEDURE — 20553 NJX 1/MLT TRIGGER POINTS 3/>: CPT | Performed by: ANESTHESIOLOGY

## 2022-02-23 PROCEDURE — 76942 ECHO GUIDE FOR BIOPSY: CPT | Performed by: ANESTHESIOLOGY

## 2022-02-23 RX ORDER — BUPIVACAINE HYDROCHLORIDE 2.5 MG/ML
10 INJECTION, SOLUTION EPIDURAL; INFILTRATION; INTRACAUDAL
Status: COMPLETED | OUTPATIENT
Start: 2022-02-23 | End: 2022-02-23

## 2022-02-23 RX ORDER — TRIAMCINOLONE ACETONIDE 40 MG/ML
40 INJECTION, SUSPENSION INTRA-ARTICULAR; INTRAMUSCULAR
Status: COMPLETED | OUTPATIENT
Start: 2022-02-23 | End: 2022-02-23

## 2022-02-23 RX ADMIN — BUPIVACAINE HYDROCHLORIDE 10 ML: 2.5 INJECTION, SOLUTION EPIDURAL; INFILTRATION; INTRACAUDAL at 07:39

## 2022-02-23 RX ADMIN — TRIAMCINOLONE ACETONIDE 40 MG: 40 INJECTION, SUSPENSION INTRA-ARTICULAR; INTRAMUSCULAR at 07:39

## 2022-02-23 NOTE — PROGRESS NOTES
Universal Protocol:  Consent: Verbal consent obtained  Written consent obtained  Risks and benefits: risks, benefits and alternatives were discussed  Consent given by: patient  Time out: Immediately prior to procedure a "time out" was called to verify the correct patient, procedure, equipment, support staff and site/side marked as required  Timeout called at: 2/23/2022 7:05 AM   Patient understanding: patient states understanding of the procedure being performed  Patient consent: the patient's understanding of the procedure matches consent given  Procedure consent: procedure consent matches procedure scheduled  Relevant documents: relevant documents present and verified  Test results: test results available and properly labeled  Site marked: the operative site was marked  Radiology Images displayed and confirmed   If images not available, report reviewed: imaging studies available  Patient identity confirmed: verbally with patient    Supporting Documentation  Indications: pain   Trigger Point Injections: multiple trigger points: 3 or more muscle groups    Injection site identified by: ultrasound    Procedure Details  Location(s):    Hip/Pelvis: R gluteus cuca, R gluteus medius and R gluteus minimus     Prep: patient was prepped and draped in usual sterile fashion  Needle size: 22 G  Medications: 10 mL bupivacaine (PF) 0 25 %; 40 mg triamcinolone acetonide 40 mg/mL  Patient tolerance: patient tolerated the procedure well with no immediate complications

## 2022-02-23 NOTE — PATIENT INSTRUCTIONS
1  Do not apply heat to any area that is numb  If you have discomfort or soreness at the injection site, you may apply ice today, 20 minutes on and 20 minutes off  Tomorrow you may use ice or warm, moist heat  Do not apply ice or heat directly to the skin  2  If you experience severe shortness of breath, go to the Emergency Room  3  You may have numbness for several hours from the local anesthetic  Please use caution and common sense, especially with weight-bearing activities  4  You may have an increase or change in the discomfort for 36-48 hours after your treatment  Apply ice and continue with any pain medicine you have been prescribed  5  Do not do anything strenuous today  You may shower, but no tub baths or hot tubs today  You may resume your normal activities tomorrow, but do not overdo it  Resume normal activities slowly when you are feeling better  6  If you experience redness, drainage or swelling at the injection site, or if you develop a fever above 100 degrees, please call [facility] at [phone] or go to the Emergency Room  7  Continue to take all routine medicines prescribed by your primary care physician unless otherwise instructed by our staff  Most blood thinners should be started again according to your regularly scheduled dosing  If you have any questions, please give our office a call  As no general anesthesia was used in today's procedure, you should not experience any side effects related to anesthesia  If you have a problem specifically related to your procedure, please call our office at [phone]  Problems not related to your procedure should be directed to your primary care physician

## 2022-03-02 ENCOUNTER — TELEPHONE (OUTPATIENT)
Dept: PAIN MEDICINE | Facility: CLINIC | Age: 42
End: 2022-03-02

## 2022-03-21 ENCOUNTER — OFFICE VISIT (OUTPATIENT)
Dept: URGENT CARE | Facility: CLINIC | Age: 42
End: 2022-03-21
Payer: COMMERCIAL

## 2022-03-21 VITALS
HEIGHT: 62 IN | TEMPERATURE: 98.3 F | SYSTOLIC BLOOD PRESSURE: 118 MMHG | RESPIRATION RATE: 18 BRPM | WEIGHT: 120 LBS | HEART RATE: 68 BPM | DIASTOLIC BLOOD PRESSURE: 80 MMHG | BODY MASS INDEX: 22.08 KG/M2 | OXYGEN SATURATION: 99 %

## 2022-03-21 DIAGNOSIS — L30.9 DERMATITIS: Primary | ICD-10-CM

## 2022-03-21 PROCEDURE — 99203 OFFICE O/P NEW LOW 30 MIN: CPT | Performed by: PHYSICIAN ASSISTANT

## 2022-03-21 RX ORDER — HYDROXYZINE PAMOATE 25 MG/1
25 CAPSULE ORAL 3 TIMES DAILY PRN
Qty: 30 CAPSULE | Refills: 0 | Status: SHIPPED | OUTPATIENT
Start: 2022-03-21

## 2022-03-21 RX ORDER — PREDNISONE 10 MG/1
TABLET ORAL
Qty: 20 TABLET | Refills: 0 | Status: SHIPPED | OUTPATIENT
Start: 2022-03-21

## 2022-03-21 NOTE — PATIENT INSTRUCTIONS
Rosacea   WHAT YOU NEED TO KNOW:   Rosacea is a long-term skin condition that causes inflammation of your face  DISCHARGE INSTRUCTIONS:   Prevent rosacea flares:   · Avoid hot drinks or drinks that contain alcohol  · Avoid being in the sun for long periods of time  Use sunscreen that is SPF 15 or higher every time you go outside  Wear a wide-brimmed hat while you are outdoors  · Avoid skin care products that have alcohol, menthol, or salt in them  Use fragrance-free products to wash your face  Be gentle when you wash your face to avoid irritation  Ask which products are best to treat dry skin  · Clean your eyelids as directed  You may need to put a warm compress on your eyes 2 times each day  Medicines:   · Antibiotic medicines: This medicine is given to treat or prevent infection  Antibiotics may also help decrease swelling, redness, and acne-like bumps  This medicine may be given as a pill or a cream to apply on your face  · Artificial tears: These help keep your eyes moist if you have dryness from ocular rosacea  · Take your medicine as directed  Contact your healthcare provider if you think your medicine is not helping or if you have side effects  Tell him of her if you are allergic to any medicine  Keep a list of the medicines, vitamins, and herbs you take  Include the amounts, and when and why you take them  Bring the list or the pill bottles to follow-up visits  Carry your medicine list with you in case of an emergency  Follow up with your healthcare provider or dermatologist as directed:  Write down your questions so you remember to ask them during your visits  Contact your healthcare provider or dermatologist if:   · You have new or worse eye redness or itching  · You feel depressed about the look of your skin  · You have questions about your condition or care      Return to the emergency department if:   · You have new or increased blurry vision, or you have vision loss       © Copyright [employer] 2021 Information is for End User's use only and may not be sold, redistributed or otherwise used for commercial purposes  All illustrations and images included in CareNotes® are the copyrighted property of [employer]  or Dorota Pulido  The above information is an  only  It is not intended as medical advice for individual conditions or treatments  Talk to your doctor, nurse or pharmacist before following any medical regimen to see if it is safe and effective for you

## 2022-03-21 NOTE — PROGRESS NOTES
[facility] [facility]        NAME: Mouna Eastman is a 40 y o  female  : 1980    MRN: [id number]  DATE: 2022  TIME: 10:18 AM    Assessment and Plan   Dermatitis [L30 9]  1  Dermatitis  hydrOXYzine pamoate (VISTARIL) 25 mg capsule    predniSONE 10 mg tablet         Patient Instructions   Patient Instructions     Rosacea   WHAT YOU NEED TO KNOW:   Rosacea is a long-term skin condition that causes inflammation of your face  DISCHARGE INSTRUCTIONS:   Prevent rosacea flares:   · Avoid hot drinks or drinks that contain alcohol  · Avoid being in the sun for long periods of time  Use sunscreen that is SPF 15 or higher every time you go outside  Wear a wide-brimmed hat while you are outdoors  · Avoid skin care products that have alcohol, menthol, or salt in them  Use fragrance-free products to wash your face  Be gentle when you wash your face to avoid irritation  Ask which products are best to treat dry skin  · Clean your eyelids as directed  You may need to put a warm compress on your eyes 2 times each day  Medicines:   · Antibiotic medicines: This medicine is given to treat or prevent infection  Antibiotics may also help decrease swelling, redness, and acne-like bumps  This medicine may be given as a pill or a cream to apply on your face  · Artificial tears: These help keep your eyes moist if you have dryness from ocular rosacea  · Take your medicine as directed  Contact your healthcare provider if you think your medicine is not helping or if you have side effects  Tell him of her if you are allergic to any medicine  Keep a list of the medicines, vitamins, and herbs you take  Include the amounts, and when and why you take them  Bring the list or the pill bottles to follow-up visits  Carry your medicine list with you in case of an emergency      Follow up with your healthcare provider or dermatologist as directed:  Write down your questions so you remember to ask them during your visits  Contact your healthcare provider or dermatologist if:   · You have new or worse eye redness or itching  · You feel depressed about the look of your skin  · You have questions about your condition or care  Return to the emergency department if:   · You have new or increased blurry vision, or you have vision loss  © Copyright [employer] 2021 Information is for End User's use only and may not be sold, redistributed or otherwise used for commercial purposes  All illustrations and images included in CareNotes® are the copyrighted property of A D A M , Inc  or Dorota Pineda   The above information is an  only  It is not intended as medical advice for individual conditions or treatments  Talk to your doctor, nurse or pharmacist before following any medical regimen to see if it is safe and effective for you  Follow up with PCP in 3-5 days  Proceed to  ER if symptoms worsen  Chief Complaint     Chief Complaint   Patient presents with    Rash         History of Present Illness       Patient is a 44-year-old female with past medical history significant for thyroid disorder who presents to the clinic complaining of itching and burning of her face for approximately 1 day  The patient states she also noticed a bumpy rash on her face  She states that she also has generalized itching throughout her body but denies associated rash  She states she does have a history of sensitive skin and follows with a dermatologist   She is currently on clindamycin for acne but denies any new medications, creams, soaps, powders, or shin bruise  The patient denies associated swelling of her tongue, swelling of her throat, cough, or shortness of breath  She states she did not take any medication for her symptoms  She has never had similar symptoms in the past       Review of Systems   Review of Systems   Constitutional: Negative for chills, fatigue and fever     Respiratory: Negative for wheezing  Cardiovascular: Negative for chest pain and palpitations  Skin: Positive for rash  Psychiatric/Behavioral: Negative for agitation and sleep disturbance  Current Medications       Current Outpatient Medications:     Clindamycin Phos-Benzoyl Perox gel, APPLY TO NEW PIMPLE BUMPS TO TWICE DAILY, Disp: , Rfl:     NP THYROID 15 MG tablet, TAKE ONE TABLET IN THE MORNING ON AN EMPTY STOMACH, Disp: , Rfl:     NP THYROID 60 MG tablet, TAKE ONE TABLET IN THE MORNING ON AN EMPTY STOMACH, Disp: , Rfl:     spironolactone (ALDACTONE) 50 mg tablet, Take 50 mg by mouth daily, Disp: , Rfl:     tiZANidine (ZANAFLEX) 2 mg tablet, Take 1 tablet (2 mg total) by mouth every 8 (eight) hours as needed for muscle spasms, Disp: 90 tablet, Rfl: 0    diclofenac sodium (VOLTAREN) 50 mg EC tablet, Take 1 tablet (50 mg total) by mouth 2 (two) times a day, Disp: 60 tablet, Rfl: 2    hydrOXYzine pamoate (VISTARIL) 25 mg capsule, Take 1 capsule (25 mg total) by mouth 3 (three) times a day as needed for itching, Disp: 30 capsule, Rfl: 0    levothyroxine (Synthroid) 125 mcg tablet, , Disp: , Rfl:     predniSONE 10 mg tablet, 4 po for 2 days, then 3x2, 2x2, and 1x2, Disp: 20 tablet, Rfl: 0    Current Allergies     Allergies as of 03/21/2022    (No Known Allergies)            The following portions of the patient's history were reviewed and updated as appropriate: allergies, current medications, past family history, past medical history, past social history, past surgical history and problem list      Past Medical History:   Diagnosis Date    Disease of thyroid gland        Past Surgical History:   Procedure Laterality Date    [place] GUIDED NEEDLE PLAC BX/ASP/INJ  7/16/2020    [place] INJECTION,SACROILIAC JOINT Right 7/16/2020    Procedure: Bilateral Sacroiliac Joint injection (30625); Surgeon: Art Deleon MD;  Location: [facility];  Service: Pain Management     TONSILLECTOMY         History reviewed   No pertinent family history  Medications have been verified  Objective   /80   Pulse 68   Temp 98 3 °F (36 8 °C)   Resp 18   Ht 5' 2" (1 575 m)   Wt 54 4 kg (120 lb)   SpO2 99%   BMI 21 95 kg/m²        Physical Exam     Physical Exam  Constitutional:       Appearance: She is well-developed  She is not diaphoretic  HENT:      Head: Normocephalic  Eyes:      General:         Right eye: No discharge  Left eye: No discharge  Pupils: Pupils are equal, round, and reactive to light  Neck:      Thyroid: No thyromegaly  Cardiovascular:      Rate and Rhythm: Normal rate  Heart sounds: No murmur heard  Pulmonary:      Effort: Pulmonary effort is normal  No respiratory distress  Breath sounds: No wheezing or rales  Chest:      Chest wall: No tenderness  Abdominal:      General: There is no distension  Palpations: Abdomen is soft  Tenderness: There is no abdominal tenderness  There is no guarding or rebound  Musculoskeletal:         General: Normal range of motion  Cervical back: Normal range of motion  Lymphadenopathy:      Cervical: No cervical adenopathy  Skin:     General: Skin is warm  Comments: There is an erythematous macular rash noted on her forehead, cheeks, lower face, and neck  There is no specific pattern to the rash and it does not mike to palpation  The rash is not papillary or bone be in nature  There is no discharge  Neurological:      Mental Status: She is alert and oriented to person, place, and time  -differential includes rosacea, inflammatory, or allergic dermatitis  Since the patient has follow-up with her dermatologist tomorrow he will treat her symptomatically with prednisone for inflammation and Vistaril for itching  The patient should follow-up with her dermatologist for follow-up as scheduled tomorrow  She was instructed to go to the ER if symptoms worsen

## 2022-11-18 ENCOUNTER — TELEPHONE (OUTPATIENT)
Dept: PAIN MEDICINE | Facility: CLINIC | Age: 42
End: 2022-11-18

## 2022-11-18 NOTE — TELEPHONE ENCOUNTER
Patient has not been seen in the office for follow-up visit in almost a year  She would need a follow-up visit to discuss

## 2022-11-18 NOTE — TELEPHONE ENCOUNTER
----- Message from Clifford Helm RN sent at 11/17/2022  3:23 PM EST -----  Regarding: FW: Referral  Contact: [phone]  Please advise  Pt last seen 12/30/21 in the office  ----- Message -----  From: Janice Ely  Sent: 11/17/2022   3:18 PM EST  To: [facility]  Subject: Referral                                         Valerio Christopher:  I am looking at going to [facility] in [place] to talk to a neurosurgeon about possible surgery on my back  I can't make an appointment unless I have a referral from a previous doctor  Is this something that you can do for me?     Thanks,  Chelita

## 2022-12-06 ENCOUNTER — OFFICE VISIT (OUTPATIENT)
Dept: PAIN MEDICINE | Facility: CLINIC | Age: 42
End: 2022-12-06

## 2022-12-06 VITALS
BODY MASS INDEX: 22.63 KG/M2 | DIASTOLIC BLOOD PRESSURE: 63 MMHG | HEIGHT: 62 IN | SYSTOLIC BLOOD PRESSURE: 91 MMHG | HEART RATE: 90 BPM | WEIGHT: 123 LBS

## 2022-12-06 DIAGNOSIS — M54.50 CHRONIC RIGHT-SIDED LOW BACK PAIN WITHOUT SCIATICA: ICD-10-CM

## 2022-12-06 DIAGNOSIS — G89.4 CHRONIC PAIN SYNDROME: Primary | ICD-10-CM

## 2022-12-06 DIAGNOSIS — M54.16 LUMBAR RADICULOPATHY: ICD-10-CM

## 2022-12-06 DIAGNOSIS — M51.36 LUMBAR DEGENERATIVE DISC DISEASE: ICD-10-CM

## 2022-12-06 DIAGNOSIS — M43.17 SPONDYLOLISTHESIS AT L5-S1 LEVEL: ICD-10-CM

## 2022-12-06 DIAGNOSIS — G89.29 CHRONIC RIGHT-SIDED LOW BACK PAIN WITHOUT SCIATICA: ICD-10-CM

## 2022-12-06 NOTE — PATIENT INSTRUCTIONS
Core Strengthening Exercises   AMBULATORY CARE:   What you need to know about core strengthening exercises: Your core includes the muscles of your lower back, hip, pelvis, and abdomen  Core strengthening exercises help heal and strengthen these muscles  This helps prevent another injury, and keeps your pelvis, spine, and hips in the correct position  Contact your healthcare provider if:   You have sharp or worsening pain during exercise or at rest     You have questions or concerns about your shoulder exercises  Safety tips:  Talk to your healthcare provider before you start an exercise program  A physical therapist can teach you how to do core strengthening exercises safely  Do the exercises on a mat or firm surface  A firm surface will support your spine and prevent low back pain  Do not do these exercises on a bed  Move slowly and smoothly  Avoid fast or jerky motions  Stop if you feel pain  Core exercises should not be painful  Stop if you feel pain  Breathe normally during core exercises  Do not hold your breath  This may cause an increase in blood pressure and prevent muscle strengthening  Your healthcare provider will tell you when to inhale and exhale during the exercise  Begin all of your exercises with abdominal bracing  Abdominal bracing helps warm up your core muscles  You can also practice abdominal bracing throughout the day  Lie on your back with your knees bent and feet flat on the floor  Place your arms in a relaxed position beside your body  Tighten your abdominal muscles  Pull your belly button in and up toward your spine  Hold for 5 seconds  Relax your muscles  Repeat 10 times  Core strengthening exercises: Your healthcare provider will tell you how often to do these exercises  The provider will also tell you how many repetitions of each exercise you should do  Hold each exercise for 5 seconds or as directed   As you get stronger, increase your hold to 10 to 15 seconds  You can do some of these exercises on a stability ball, or with a weight  Ask your healthcare provider how to use a stability ball or weight for these exercises:  Bridging:  Lie on your back with your knees bent and feet flat on the floor  Rest your arms at your side  Tighten your buttocks, and then lift your hips 1 inch off the floor  Hold for 5 seconds  When you can do this exercise without pain for 10 seconds, increase the distance you lift your hips  A good goal is to be able to lift your hips so that your shoulders, hips, and knees are in a straight line  Dead bug:  Lie on your back with your knees bent and feet flat on the floor  Place your arms in a relaxed position beside your body  Begin with abdominal bracing  Next, raise one leg, keeping your knee bent  Hold for 5 seconds  Repeat with the other leg  When you can do this exercise without pain for 10 to 15 seconds, you may raise one straight leg and hold  Repeat with the other leg  Quadruped:  Place your hands and knees on the floor  Keep your wrists directly below your shoulders and your knees directly below your hips  Pull your belly button in toward your spine  Do not flatten or arch your back  Tighten your abdominal muscles below your belly button  Hold for 5 seconds  When you can do this exercise without pain for 10 to 15 seconds, you may extend one arm and hold  Repeat on the other side  Side bridge exercises:      Standing side bridge:  Stand next to a wall and extend one arm toward the wall  Place your palm flat on the wall with your fingers pointing upward  Begin with abdominal bracing  Next, without moving your feet, slowly bend your arm to 90 degrees  Hold for 5 seconds  Repeat on the other side  When you can do this exercise without pain for 10 to 15 seconds, you may do the bent leg side bridge on the floor  Bent leg side bridge:  Lie on one side with your legs, hips, and shoulders in a straight line  Prop yourself up onto your forearm so your elbow is directly below your shoulder  Bend your knees back to 90 degrees  Begin with abdominal bracing  Next, lift your hips and balance yourself on your forearm and knees  Hold for 5 seconds  Repeat on the other side  When you can do this exercise without pain for 10 to 15 seconds, you may do the straight leg side bridge on the floor  Straight leg side bridge:  Lie on one side with your legs, hips, and shoulders in a straight line  Prop yourself up onto your forearm so your elbow is directly below your shoulder  Begin with abdominal bracing  Lift your hips off the floor and balance yourself on your forearm and the outside of your flexed foot  Do not let your ankle bend sideways  Hold for 5 seconds  Repeat on the other side  When you can do this exercise without pain for 10 to 15 seconds, ask your healthcare provider for more advanced exercises  Superman:  Lie on your stomach  Extend your arms forward on the floor  Tighten your abdominal muscles and lift your right hand and left leg off the floor  Hold this position  Slowly return to the starting position  Tighten your abdominal muscles and lift your left hand and right leg off the floor  Hold this position  Slowly return to the starting position  Clam:  Lie on your side with your knees bent  Put your bottom arm under your head to keep your neck in line  Put your top hand on your hip to keep your pelvis from moving  Put your heels together, and keep them together during this exercise  Slowly raise your top knee toward the ceiling  Then lower your leg so your knees are together  Repeat this exercise 10 times  Then switch sides and do the exercise 10 times with the other leg  Curl up:  Lie on your back with your knees bent and feet flat on the floor  Place your hands, palms down, underneath your lower back   Next, with your elbows on the floor, lift your shoulders and chest 2 to 3 inches off the floor  Keep your head in line with your shoulders  Hold this position  Slowly return to the starting position  Straight leg raises:  Lie on your back with one leg straight  Bend the other knee and place your foot flat on the floor  Tighten your abdominal muscles  Keep your leg straight and slowly lift it straight up 6 to 12 inches off the floor  Hold this position  Lower your leg slowly  Do as many repetitions as directed on this side  Repeat with the other leg  Plank:  Lie on your stomach  Bend your elbows and place your forearms flat on the floor  Lift your chest, stomach, and knees off the floor  Make sure your elbows are below your shoulders  Your body should be in a straight line  Do not let your hips or lower back sink to the ground  Squeeze your abdominal muscles together and hold for 15 seconds  To make this exercise harder, hold for 30 seconds or lift 1 leg at a time  Bicycles:  Lie on your back  Bend both knees and bring them toward your chest  Your calves should be parallel to the floor  Place the palms of your hands on the back of your head  Straighten your right leg and keep it lifted 2 inches off the floor  Raise your head and shoulders off the floor and twist towards your left  Keep your head and shoulders lifted  Bend your right knee while you straighten your left leg  Keep your left leg 2 inches off the floor  Twist your head and chest towards the left leg  Continue to straighten 1 leg at a time and twist        Follow up with your doctor as directed:  Write down your questions so you remember to ask them during your visits  © Copyright [employer] 2022 Information is for End User's use only and may not be sold, redistributed or otherwise used for commercial purposes  All illustrations and images included in CareNotes® are the copyrighted property of [employer]  or Dorota Pineda   The above information is an  only   It is not intended as medical advice for individual conditions or treatments  Talk to your doctor, nurse or pharmacist before following any medical regimen to see if it is safe and effective for you

## 2022-12-06 NOTE — PROGRESS NOTES
Assessment:  1  Chronic pain syndrome    2  Chronic right-sided low back pain without sciatica    3  Spondylolisthesis at L5-S1 level    4  Lumbar radiculopathy    5  Lumbar degenerative disc disease      Plan:  While the patient was in the office today, I did have a thorough conversation regarding their chronic pain syndrome, medication management, and treatment plan options  Patient is being seen for a follow-up visit  He was last seen here in February 2022 at which time she underwent her point injections involving the right gluteal minimus, medius, cuca muscles  She reports temporary improvement  Returns to the office today complaining of low back pain that can radiate down both legs  There is intermittent numbness tingling and weakness in both lower extremities  Today, we discussed possibility of physical therapy  Patient declined stating that she has done physical therapy in the past without success  As such, I provided her with home exercises for lumbar core strengthening/stretching  She was advised to do the exercises 20-30 minutes at least 4 times a week  We will order an MRI of her lumbar spine to determine if there is worsening intraspinal pathology that would contribute to her current symptoms  Continue over-the-counter Advil if needed and managing if needed  She did not require refill of tizanidine during today's visit  Follow-up in 6 weeks  History of Present Illness: The patient is a 40 y o  female who presents for a follow up office visit in regards to Back Pain  The patient’s current symptoms include complaints of low back pain that can radiate down both legs  She reports intermittent numbness tingling and weakness in both legs  Pain levels a 5/10  Quality pain is described as burning, dull, aching, sharp  Current pain medications includes: Over-the-counter Advil if needed    She has a prescription for tizanidine 2 mg which she uses on a strictly as-needed basis     I have personally reviewed and/or updated the patient's past medical history, past surgical history, family history, social history, current medications, allergies, and vital signs today  Review of Systems  Review of Systems   Constitutional: Negative for unexpected weight change  HENT: Negative for hearing loss  Eyes: Negative for visual disturbance  Respiratory: Negative for shortness of breath  Cardiovascular: Negative for leg swelling  Gastrointestinal: Negative for constipation  Endocrine: Negative for polyuria  Genitourinary: Negative for difficulty urinating  Musculoskeletal: Positive for gait problem (at times)  Negative for joint swelling and myalgias  Skin: Negative for rash  Neurological: Negative for weakness and headaches  Psychiatric/Behavioral: Negative for decreased concentration  All other systems reviewed and are negative  Past Medical History:   Diagnosis Date   • Disease of thyroid gland        Past Surgical History:   Procedure Laterality Date   • [place] GUIDED NEEDLE PLAC BX/ASP/INJ  7/16/2020   • [place] INJECTION,SACROILIAC JOINT Right 7/16/2020    Procedure: Bilateral Sacroiliac Joint injection (34001); Surgeon: Catalina Montoya MD;  Location: [facility];  Service: Pain Management    • TONSILLECTOMY         History reviewed  No pertinent family history      Social History     Occupational History   • Not on file   Tobacco Use   • Smoking status: Never   • Smokeless tobacco: Never   Vaping Use   • Vaping Use: Never used   Substance and Sexual Activity   • Alcohol use: Yes     Comment: social   • Drug use: Never   • Sexual activity: Not on file         Current Outpatient Medications:   •  Clindamycin Phos-Benzoyl Perox gel, APPLY TO NEW PIMPLE BUMPS TO TWICE DAILY, Disp: , Rfl:   •  NP THYROID 15 MG tablet, TAKE ONE TABLET IN THE MORNING ON AN EMPTY STOMACH, Disp: , Rfl:   •  NP THYROID 60 MG tablet, 90 mg, Disp: , Rfl:   •  spironolactone (ALDACTONE) 50 mg tablet, Take 50 mg by mouth daily, Disp: , Rfl:   •  tiZANidine (ZANAFLEX) 2 mg tablet, Take 1 tablet (2 mg total) by mouth every 8 (eight) hours as needed for muscle spasms, Disp: 90 tablet, Rfl: 0  •  diclofenac sodium (VOLTAREN) 50 mg EC tablet, Take 1 tablet (50 mg total) by mouth 2 (two) times a day, Disp: 60 tablet, Rfl: 2  •  hydrOXYzine pamoate (VISTARIL) 25 mg capsule, Take 1 capsule (25 mg total) by mouth 3 (three) times a day as needed for itching, Disp: 30 capsule, Rfl: 0  •  levothyroxine (Synthroid) 125 mcg tablet, , Disp: , Rfl:   •  predniSONE 10 mg tablet, 4 po for 2 days, then 3x2, 2x2, and 1x2, Disp: 20 tablet, Rfl: 0    No Known Allergies    Physical Exam:    BP 91/63   Pulse 90   Ht 5' 2" (1 575 m)   Wt 55 8 kg (123 lb)   BMI 22 50 kg/m²     Constitutional:normal, well developed, well nourished, alert, in no distress and non-toxic and no overt pain behavior    Eyes:anicteric  HEENT:grossly intact  Neck:supple, symmetric, trachea midline and no masses   Pulmonary:even and unlabored  Cardiovascular:No edema or pitting edema present  Skin:Normal without rashes or lesions and well hydrated  Psychiatric:Mood and affect appropriate  Neurologic:Cranial Nerves II-XII grossly intact  Musculoskeletal:normal    Imaging  MRI lumbar spine without contrast    (Results Pending)       Orders Placed This Encounter   Procedures   • MRI lumbar spine without contrast

## 2022-12-23 ENCOUNTER — TELEPHONE (OUTPATIENT)
Dept: PAIN MEDICINE | Facility: CLINIC | Age: 42
End: 2022-12-23

## 2022-12-23 NOTE — TELEPHONE ENCOUNTER
Caller: Evaristo Youssef from [address] [place] [address]    Doctor: Dileep Anderson    Reason for call: MRI of lumbar spine requires prior authorization   Please obtain, thx    NPI# [id number]    Address: [facility] [address] [place] [address], [facility]      Call back#: [phone]

## 2022-12-27 NOTE — TELEPHONE ENCOUNTER
Caller: Kristine Jc ( [address] [facility])    Doctor: Dr Lenard Carranza    Reason for call: Prior auth for the MRI   It is scheduled for 12/29/2022  Call back#: [phone]

## 2022-12-28 NOTE — TELEPHONE ENCOUNTER
Caller: [place] from [address] [facility]     Doctor: Tarik Morrison    Reason for call: [place] states pt is scheduled for her MRI study tomorrow at 2 pm  They still don't have an authorization for her & may have to cancel until one is obtained   Please advisekp    Call back#: [phone]

## 2023-03-28 ENCOUNTER — TELEPHONE (OUTPATIENT)
Dept: PAIN MEDICINE | Facility: CLINIC | Age: 43
End: 2023-03-28

## 2023-03-28 NOTE — TELEPHONE ENCOUNTER
Caller: [facility]     Doctor: Stephanie Funk     Reason for call: they were unable to get in contact with patient to schedule MRI, they are going to just cancel all her info and if and when she is interested in scheduling she will have to call back and start the process

## (undated) DEVICE — NEEDLE SPINAL 22G X 3.5IN  QUINCKE

## (undated) DEVICE — BANDAID SHEER SPOT

## (undated) DEVICE — SYRINGE 3ML LL

## (undated) DEVICE — CHLORAPREP HI-LITE 10.5ML ORANGE

## (undated) DEVICE — NEEDLE 25G X 1 1/2

## (undated) DEVICE — GLOVE SRG BIOGEL 8

## (undated) DEVICE — FLEXIBLE ADHESIVE BANDAGE,X-LARGE: Brand: CURITY

## (undated) DEVICE — NEEDLE 18 G X 1 1/2

## (undated) DEVICE — SYRINGE 5ML LL